# Patient Record
Sex: FEMALE | Race: WHITE | NOT HISPANIC OR LATINO | Employment: UNEMPLOYED | URBAN - METROPOLITAN AREA
[De-identification: names, ages, dates, MRNs, and addresses within clinical notes are randomized per-mention and may not be internally consistent; named-entity substitution may affect disease eponyms.]

---

## 2020-12-03 ENCOUNTER — OFFICE VISIT (OUTPATIENT)
Dept: INTERNAL MEDICINE | Facility: CLINIC | Age: 40
End: 2020-12-03

## 2020-12-03 VITALS
HEART RATE: 101 BPM | DIASTOLIC BLOOD PRESSURE: 82 MMHG | OXYGEN SATURATION: 97 % | HEIGHT: 65 IN | TEMPERATURE: 97.3 F | WEIGHT: 212 LBS | BODY MASS INDEX: 35.32 KG/M2 | SYSTOLIC BLOOD PRESSURE: 128 MMHG

## 2020-12-03 DIAGNOSIS — R00.2 PALPITATION: ICD-10-CM

## 2020-12-03 DIAGNOSIS — M79.7 FIBROMYALGIA: Primary | ICD-10-CM

## 2020-12-03 DIAGNOSIS — R07.89 CHEST WALL DISCOMFORT: ICD-10-CM

## 2020-12-03 DIAGNOSIS — Z30.011 ENCOUNTER FOR INITIAL PRESCRIPTION OF CONTRACEPTIVE PILLS: ICD-10-CM

## 2020-12-03 PROBLEM — J30.2 SEASONAL ALLERGIC RHINITIS: Status: ACTIVE | Noted: 2020-12-03

## 2020-12-03 PROBLEM — R12 HEARTBURN: Status: ACTIVE | Noted: 2020-12-03

## 2020-12-03 PROBLEM — Q05.9: Status: ACTIVE | Noted: 2018-06-21

## 2020-12-03 PROBLEM — Q05.8: Status: ACTIVE | Noted: 2018-09-05

## 2020-12-03 PROCEDURE — 93000 ELECTROCARDIOGRAM COMPLETE: CPT | Performed by: FAMILY MEDICINE

## 2020-12-03 PROCEDURE — 99204 OFFICE O/P NEW MOD 45 MIN: CPT | Performed by: FAMILY MEDICINE

## 2020-12-03 RX ORDER — UREA 10 %
LOTION (ML) TOPICAL
COMMUNITY

## 2020-12-03 RX ORDER — DROSPIRENONE AND ETHINYL ESTRADIOL 0.02-3(28)
KIT ORAL
COMMUNITY
End: 2020-12-03 | Stop reason: SDUPTHER

## 2020-12-03 RX ORDER — CYCLOBENZAPRINE HCL 5 MG
TABLET ORAL
COMMUNITY
End: 2020-12-03 | Stop reason: SDUPTHER

## 2020-12-03 RX ORDER — CETIRIZINE HYDROCHLORIDE 10 MG/1
10 TABLET ORAL DAILY
COMMUNITY

## 2020-12-03 RX ORDER — DROSPIRENONE AND ETHINYL ESTRADIOL 0.02-3(28)
1 KIT ORAL DAILY
Qty: 90 TABLET | Refills: 3 | Status: SHIPPED | OUTPATIENT
Start: 2020-12-03 | End: 2021-11-10

## 2020-12-03 RX ORDER — CYCLOBENZAPRINE HCL 5 MG
5 TABLET ORAL NIGHTLY PRN
Qty: 90 TABLET | Refills: 3 | Status: SHIPPED | OUTPATIENT
Start: 2020-12-03 | End: 2021-11-17

## 2020-12-03 NOTE — PROGRESS NOTES
Venipuncture performed in [facility] by KD with good hemostasis. Patient tolerated well. 12/3/2020 Miladis CARRILLO LPN.
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
## 2020-12-04 ENCOUNTER — TELEPHONE (OUTPATIENT)
Dept: INTERNAL MEDICINE | Facility: CLINIC | Age: 40
End: 2020-12-04

## 2020-12-04 PROBLEM — R74.01 ALT (SGPT) LEVEL RAISED: Status: ACTIVE | Noted: 2020-12-04

## 2020-12-04 LAB
25(OH)D3+25(OH)D2 SERPL-MCNC: 34.5 NG/ML (ref 30–100)
ALBUMIN SERPL-MCNC: 4.8 G/DL (ref 3.8–4.8)
ALBUMIN/GLOB SERPL: 1.6 {RATIO} (ref 1.2–2.2)
ALP SERPL-CCNC: 60 IU/L (ref 39–117)
ALT SERPL-CCNC: 49 IU/L (ref 0–32)
AST SERPL-CCNC: 35 IU/L (ref 0–40)
BASOPHILS # BLD AUTO: 0.1 X10E3/UL (ref 0–0.2)
BASOPHILS NFR BLD AUTO: 1 %
BILIRUB SERPL-MCNC: 0.3 MG/DL (ref 0–1.2)
BUN SERPL-MCNC: 10 MG/DL (ref 6–20)
BUN/CREAT SERPL: 14 (ref 9–23)
CALCIUM SERPL-MCNC: 9.9 MG/DL (ref 8.7–10.2)
CHLORIDE SERPL-SCNC: 101 MMOL/L (ref 96–106)
CO2 SERPL-SCNC: 22 MMOL/L (ref 20–29)
CREAT SERPL-MCNC: 0.73 MG/DL (ref 0.57–1)
EOSINOPHIL # BLD AUTO: 0.2 X10E3/UL (ref 0–0.4)
EOSINOPHIL NFR BLD AUTO: 2 %
ERYTHROCYTE [DISTWIDTH] IN BLOOD BY AUTOMATED COUNT: 12.1 % (ref 11.7–15.4)
FT4I SERPL CALC-MCNC: 1.5 (ref 1.2–4.9)
GLOBULIN SER CALC-MCNC: 3 G/DL (ref 1.5–4.5)
GLUCOSE SERPL-MCNC: 94 MG/DL (ref 65–99)
HCT VFR BLD AUTO: 43.4 % (ref 34–46.6)
HGB BLD-MCNC: 14.6 G/DL (ref 11.1–15.9)
IMM GRANULOCYTES # BLD AUTO: 0 X10E3/UL (ref 0–0.1)
IMM GRANULOCYTES NFR BLD AUTO: 0 %
LYMPHOCYTES # BLD AUTO: 2.2 X10E3/UL (ref 0.7–3.1)
LYMPHOCYTES NFR BLD AUTO: 28 %
MCH RBC QN AUTO: 29.6 PG (ref 26.6–33)
MCHC RBC AUTO-ENTMCNC: 33.6 G/DL (ref 31.5–35.7)
MCV RBC AUTO: 88 FL (ref 79–97)
MONOCYTES # BLD AUTO: 0.3 X10E3/UL (ref 0.1–0.9)
MONOCYTES NFR BLD AUTO: 4 %
NEUTROPHILS # BLD AUTO: 5.1 X10E3/UL (ref 1.4–7)
NEUTROPHILS NFR BLD AUTO: 65 %
PLATELET # BLD AUTO: 334 X10E3/UL (ref 150–450)
POTASSIUM SERPL-SCNC: 4.6 MMOL/L (ref 3.5–5.2)
PROT SERPL-MCNC: 7.8 G/DL (ref 6–8.5)
RBC # BLD AUTO: 4.94 X10E6/UL (ref 3.77–5.28)
SODIUM SERPL-SCNC: 140 MMOL/L (ref 134–144)
T3RU NFR SERPL: 23 % (ref 24–39)
T4 SERPL-MCNC: 6.4 UG/DL (ref 4.5–12)
TSH SERPL DL<=0.005 MIU/L-ACNC: 1.08 UIU/ML (ref 0.45–4.5)
VIT B12 SERPL-MCNC: 1079 PG/ML (ref 232–1245)
WBC # BLD AUTO: 8 X10E3/UL (ref 3.4–10.8)

## 2020-12-04 NOTE — TELEPHONE ENCOUNTER
----- Message from Carter Ellis MD sent at 12/4/2020 12:19 PM EST -----  Please call the patient about their results with the following discussion points:    Kidneys, electrolytes, vitamin D, thyroid, vitamin B12 are all normal  One of her liver tests is very very slightly elevated, we sometimes see this with early fatty liver disease, so this is likely related to her weight.  I see this commonly, and should not cause any problems at this level. We will continue to follow it annually.  Nothing in the blood work points to other causes of her chest discomfort  
LAKESHIAVM for pt re: lab results. Pt was advised of all normal results, as well as indication for early fatty liver disease. Pt was advised to continue watchful waiting, and OTC tx for chest pain, per Dr. Ellis.  
Detail Level: Generalized
Detail Level: Zone

## 2021-04-16 ENCOUNTER — BULK ORDERING (OUTPATIENT)
Dept: CASE MANAGEMENT | Facility: OTHER | Age: 41
End: 2021-04-16

## 2021-04-16 DIAGNOSIS — Z23 IMMUNIZATION DUE: ICD-10-CM

## 2021-05-05 ENCOUNTER — TELEPHONE (OUTPATIENT)
Dept: INTERNAL MEDICINE | Facility: CLINIC | Age: 41
End: 2021-05-05

## 2021-05-05 NOTE — TELEPHONE ENCOUNTER
[facility] for pt re: COVID testing. Pt was advised that we are closed today, as Dr. Ellis is not in office on Wed. Pt was advised to go to either [facility] or contact pharmacies for testing locations.    Pt was also advised that should she wish to wait until we open tomorrow, she will have to be scheduled on the Nurse schedule, stay in her car and call office for me to come out and swab her.     Pt was encouraged to call office with any questions, or to schedule appt.

## 2021-05-06 ENCOUNTER — CLINICAL SUPPORT (OUTPATIENT)
Dept: INTERNAL MEDICINE | Facility: CLINIC | Age: 41
End: 2021-05-06

## 2021-05-06 DIAGNOSIS — R50.9 FEVER, UNSPECIFIED FEVER CAUSE: Primary | ICD-10-CM

## 2021-05-06 DIAGNOSIS — R52 BODY ACHES: ICD-10-CM

## 2021-05-07 LAB
LABCORP SARS-COV-2, NAA 2 DAY TAT: NORMAL
SARS-COV-2 RNA RESP QL NAA+PROBE: NOT DETECTED

## 2021-05-27 DIAGNOSIS — Z30.011 ENCOUNTER FOR INITIAL PRESCRIPTION OF CONTRACEPTIVE PILLS: ICD-10-CM

## 2021-05-27 RX ORDER — DROSPIRENONE AND ETHINYL ESTRADIOL 0.02-3(28)
1 KIT ORAL DAILY
Qty: 90 TABLET | Refills: 3 | Status: CANCELLED | OUTPATIENT
Start: 2021-05-27

## 2021-05-28 NOTE — TELEPHONE ENCOUNTER
Spoke to patient and advised she should have refills on file with pharmacy and to contact them.  If patient has any issues, she is to call us back.  Pt was in agreement.

## 2021-05-28 NOTE — TELEPHONE ENCOUNTER
Caller: Giana Barraza    Relationship: Self    Best call back number: [phone]    Medication needed:   Requested Prescriptions     Pending Prescriptions Disp Refills   • drospirenone-ethinyl estradiol (Gianvi) 3-0.02 MG per tablet 90 tablet 3     Sig: Take 1 tablet by mouth Daily.       When do you need the refill by: ASAP    What additional details did the patient provide when requesting the medication: PATIENT STATED NEEDS MEDICATION WILL START Sunday AND PATIENT ONLY HAD TWO REFILLS ON MEDICATION.     Does the patient have less than a 3 day supply:  [x] Yes  [] No    What is the patient's preferred pharmacy:    [facility]/pharmacy #[id number] - [place], [place] - [address] [phone] [facility] [phone] FX

## 2021-06-03 ENCOUNTER — OFFICE VISIT (OUTPATIENT)
Dept: INTERNAL MEDICINE | Facility: CLINIC | Age: 41
End: 2021-06-03

## 2021-06-03 VITALS
SYSTOLIC BLOOD PRESSURE: 124 MMHG | HEART RATE: 101 BPM | WEIGHT: 221 LBS | BODY MASS INDEX: 36.82 KG/M2 | DIASTOLIC BLOOD PRESSURE: 78 MMHG | OXYGEN SATURATION: 98 % | HEIGHT: 65 IN

## 2021-06-03 DIAGNOSIS — M79.7 FIBROMYALGIA: Primary | ICD-10-CM

## 2021-06-03 DIAGNOSIS — R12 HEARTBURN: ICD-10-CM

## 2021-06-03 DIAGNOSIS — R74.01 ALT (SGPT) LEVEL RAISED: ICD-10-CM

## 2021-06-03 DIAGNOSIS — R07.89 CHEST WALL DISCOMFORT: ICD-10-CM

## 2021-06-03 DIAGNOSIS — L30.9 ECZEMA, UNSPECIFIED TYPE: ICD-10-CM

## 2021-06-03 PROBLEM — R00.2 PALPITATION: Status: RESOLVED | Noted: 2020-12-03 | Resolved: 2021-06-03

## 2021-06-03 PROCEDURE — 99214 OFFICE O/P EST MOD 30 MIN: CPT | Performed by: FAMILY MEDICINE

## 2021-06-03 RX ORDER — OMEPRAZOLE 20 MG/1
20 CAPSULE, DELAYED RELEASE ORAL DAILY PRN
COMMUNITY

## 2021-06-03 RX ORDER — COVID-19 ANTIGEN TEST
KIT MISCELLANEOUS AS NEEDED
COMMUNITY

## 2021-06-03 RX ORDER — CLOBETASOL PROPIONATE 0.5 MG/G
OINTMENT TOPICAL 2 TIMES DAILY PRN
Qty: 15 G | Refills: 3 | Status: SHIPPED | OUTPATIENT
Start: 2021-06-03

## 2021-06-03 RX ORDER — MULTIPLE VITAMINS W/ MINERALS TAB 9MG-400MCG
1 TAB ORAL DAILY
COMMUNITY

## 2021-06-03 NOTE — PROGRESS NOTES
Date of Encounter: 2021  Patient: Giana Barraza,  1980    Subjective   History of Presenting Illness  Chief complaint: Follow-up    Fibromyalgia: Overall her symptoms are better with the warmer weather although she still has flares with weather changes.  Chest wall discomfort and palpitations have improved since last appointment and only encouraged sparingly.  No change in the nature of these.  Still has fairly significant premenstrual dysphoric syndrome versus fibromyalgia flare before her cycle.    Eczema, primarily of the hands with regular dishwashing and hand  use being the main aggravator, control with topical clobetasol, needs refill.    Heartburn remains well controlled on omeprazole as needed.    Has not received COVID-19 vaccination, hesitant due to concerns about safety    The following portions of the patient's history were reviewed and updated as appropriate: allergies, current medications, past family history, past medical history, past social history, past surgical history and problem list.    Patient Active Problem List   Diagnosis   • Fibromyalgia   • Lipomyelomeningocele (CMS/HCC)   • Congenital sacral meningocele (CMS/HCC)   • Palpitation   • Chest wall discomfort   • Heartburn   • Seasonal allergic rhinitis   • ALT (SGPT) level raised     Past Medical History:   Diagnosis Date   • Fibromyalgia, primary      Past Surgical History:   Procedure Laterality Date   • DILATATION AND CURETTAGE       Family History   Problem Relation Age of Onset   • Colon cancer Mother    • No Known Problems Father    • Heart disease Paternal Grandfather        Current Outpatient Medications:   •  cetirizine (zyrTEC) 10 MG tablet, Take 10 mg by mouth Daily., Disp: , Rfl:   •  cyclobenzaprine (FLEXERIL) 5 MG tablet, Take 1 tablet by mouth At Night As Needed for Muscle Spasms., Disp: 90 tablet, Rfl: 3  •  drospirenone-ethinyl estradiol (Gianvi) 3-0.02 MG per tablet, Take 1 tablet by mouth Daily.,  "Disp: 90 tablet, Rfl: 3  •  melatonin 1 MG tablet, Take  by mouth., Disp: , Rfl:   •  multivitamin with minerals (MULTIPLE VITAMINS/WOMENS PO), Take 1 tablet by mouth Daily., Disp: , Rfl:   •  Naproxen Sodium (Aleve) 220 MG capsule, Take  by mouth As Needed., Disp: , Rfl:   •  omeprazole (priLOSEC) 20 MG capsule, Take 20 mg by mouth Daily As Needed., Disp: , Rfl:   •  PROBIOTIC PRODUCT PO, qhs, Disp: , Rfl:   •  VITAMIN D PO, Vitamin D  once daily, Disp: , Rfl:   •  clobetasol (TEMOVATE) 0.05 % ointment, Apply  topically to the appropriate area as directed 2 (Two) Times a Day As Needed (eczema)., Disp: 15 g, Rfl: 3  •  PRENATAL VIT-FE FUMARATE-FA PO, Prenatal  1 po qd, Disp: , Rfl:   No Known Allergies  Social History     Tobacco Use   • Smoking status: Never Smoker   • Smokeless tobacco: Never Used   Substance Use Topics   • Alcohol use: Not on file   • Drug use: Not on file          Objective   Physical Exam  Vitals:    06/03/21 0941   BP: 124/78   Pulse: 101   SpO2: 98%   Weight: 100 kg (221 lb)   Height: 165.1 cm (65\")     Body mass index is 36.78 kg/m².    Constitutional: NAD.  Eyes: EOMI. PERRLA. Normal conjunctiva.  Cardiovascular: RRR. No murmurs. No LE edema b/l. Radial pulses 2+ bilaterally.  Pulmonary: CTA b/l. Good effort.  Integumentary: Eczematous plaque on the lateral aspect of the right index finger.  Lymphatic: No anterior cervical lymphadenopathy.  Endocrine: No thyromegaly or palpable thyroid nodules.  Psychiatric: Normal affect. Normal thought content.     Assessment/Plan   Assessment and Plan  Pleasant 40-year-old female with fibromyalgia, sacral lipomyelomeningocele asymptomatic, seasonal allergic rhinitis, heartburn, who presents with the following:    Diagnoses and all orders for this visit:    1. Fibromyalgia (Primary): Overall stable to slightly improved, continue cyclobenzaprine as needed for symptoms    2. Chest wall discomfort: Resolving, continue to monitor for recurrence or change in " nature of symptoms    3. Eczema, unspecified type  -     clobetasol (TEMOVATE) 0.05 % ointment; Apply  topically to the appropriate area as directed 2 (Two) Times a Day As Needed (eczema).  Dispense: 15 g; Refill: 3    4. Heartburn: Controlled    5. ALT (SGPT) level raised: Possibly early NAFLD, labs to recheck  -     Comprehensive Metabolic Panel    We did discuss the risks and benefits of COVID-19 vaccination    Annual physical later this year with Pap smear    Carter Ellis MD  Family Medicine  O: [phone]  C: [phone]    Disclaimer: Parts of this note were dictated by speech recognition. Minor errors in transcription may be present. Please call if questions.

## 2021-06-03 NOTE — PROGRESS NOTES
Venipuncture performed in L[facility] by KD with good hemostasis. Patient tolerated well. 6/3/2021 Miladis CARRILLO LPN.

## 2021-06-04 LAB
ALBUMIN SERPL-MCNC: 4.5 G/DL (ref 3.8–4.8)
ALBUMIN/GLOB SERPL: 1.4 {RATIO} (ref 1.2–2.2)
ALP SERPL-CCNC: 60 IU/L (ref 48–121)
ALT SERPL-CCNC: 25 IU/L (ref 0–32)
AST SERPL-CCNC: 26 IU/L (ref 0–40)
BILIRUB SERPL-MCNC: 0.4 MG/DL (ref 0–1.2)
BUN SERPL-MCNC: 8 MG/DL (ref 6–20)
BUN/CREAT SERPL: 10 (ref 9–23)
CALCIUM SERPL-MCNC: 9.9 MG/DL (ref 8.7–10.2)
CHLORIDE SERPL-SCNC: 101 MMOL/L (ref 96–106)
CO2 SERPL-SCNC: 26 MMOL/L (ref 20–29)
CREAT SERPL-MCNC: 0.8 MG/DL (ref 0.57–1)
GLOBULIN SER CALC-MCNC: 3.2 G/DL (ref 1.5–4.5)
GLUCOSE SERPL-MCNC: 91 MG/DL (ref 65–99)
POTASSIUM SERPL-SCNC: 4.7 MMOL/L (ref 3.5–5.2)
PROT SERPL-MCNC: 7.7 G/DL (ref 6–8.5)
SODIUM SERPL-SCNC: 141 MMOL/L (ref 134–144)

## 2021-11-10 DIAGNOSIS — Z30.011 ENCOUNTER FOR INITIAL PRESCRIPTION OF CONTRACEPTIVE PILLS: ICD-10-CM

## 2021-11-10 RX ORDER — ETHINYL ESTRADIOL/DROSPIRENONE 0.02-3(28)
TABLET ORAL
Qty: 84 TABLET | Refills: 3 | Status: SHIPPED | OUTPATIENT
Start: 2021-11-10 | End: 2022-10-07

## 2021-11-17 DIAGNOSIS — M79.7 FIBROMYALGIA: ICD-10-CM

## 2021-11-17 RX ORDER — CYCLOBENZAPRINE HCL 5 MG
5 TABLET ORAL NIGHTLY PRN
Qty: 90 TABLET | Refills: 3 | Status: SHIPPED | OUTPATIENT
Start: 2021-11-17 | End: 2022-11-07

## 2021-11-24 ENCOUNTER — OFFICE VISIT (OUTPATIENT)
Dept: INTERNAL MEDICINE | Facility: CLINIC | Age: 41
End: 2021-11-24

## 2021-11-24 VITALS
SYSTOLIC BLOOD PRESSURE: 126 MMHG | DIASTOLIC BLOOD PRESSURE: 80 MMHG | BODY MASS INDEX: 36.78 KG/M2 | HEIGHT: 65 IN | OXYGEN SATURATION: 100 % | TEMPERATURE: 98.6 F | HEART RATE: 96 BPM

## 2021-11-24 DIAGNOSIS — R05.9 COUGH: Primary | ICD-10-CM

## 2021-11-24 DIAGNOSIS — J30.1 SEASONAL ALLERGIC RHINITIS DUE TO POLLEN: ICD-10-CM

## 2021-11-24 DIAGNOSIS — R74.01 ALT (SGPT) LEVEL RAISED: ICD-10-CM

## 2021-11-24 DIAGNOSIS — R09.89 CHEST CONGESTION: ICD-10-CM

## 2021-11-24 DIAGNOSIS — M79.7 FIBROMYALGIA: ICD-10-CM

## 2021-11-24 LAB
EXPIRATION DATE: NORMAL
FLUAV AG NPH QL: NEGATIVE
FLUBV AG NPH QL: NEGATIVE
INTERNAL CONTROL: NORMAL
Lab: 2653

## 2021-11-24 PROCEDURE — 87804 INFLUENZA ASSAY W/OPTIC: CPT | Performed by: NURSE PRACTITIONER

## 2021-11-24 PROCEDURE — 99214 OFFICE O/P EST MOD 30 MIN: CPT | Performed by: NURSE PRACTITIONER

## 2021-11-24 RX ORDER — BENZONATATE 100 MG/1
100 CAPSULE ORAL 3 TIMES DAILY PRN
Qty: 30 CAPSULE | Refills: 0 | Status: SHIPPED | OUTPATIENT
Start: 2021-11-24 | End: 2023-02-28

## 2021-11-24 RX ORDER — AZITHROMYCIN 250 MG/1
TABLET, FILM COATED ORAL
Qty: 6 TABLET | Refills: 0 | Status: SHIPPED | OUTPATIENT
Start: 2021-11-24 | End: 2023-02-28

## 2021-11-24 RX ORDER — GUAIFENESIN 600 MG/1
1200 TABLET, EXTENDED RELEASE ORAL 2 TIMES DAILY
Qty: 20 TABLET | Refills: 0 | Status: SHIPPED | OUTPATIENT
Start: 2021-11-24 | End: 2023-02-28

## 2021-11-24 NOTE — PROGRESS NOTES
Date of Encounter: 2021  Patient: Giana Barraza,  1980    Subjective     Chief complaint: Cough and congestion    HPI   Patient states that over the last week she has started to feel sick with cough and congestion.  Patient states that she cannot sleep at night due to cough.  Patient states that she had a headache and some body aches the first day but now is mostly concerned about her cough.  Patient states that she has become fatigued during the day as well.  Patient denies any fever.  Patient states that her son has been sick as well.    Review of Systems:  Negative for fever, chest pain upon exertion, shortness of breath, vision changes, vomiting, dysuria, lymphadenopathy, muscle weakness, numbness, mood changes, rashes.    The following portions of the patient's history were reviewed and updated as appropriate: allergies, current medications, past family history, past medical history, past social history, past surgical history and problem list.    Patient Active Problem List   Diagnosis   • Fibromyalgia   • Lipomyelomeningocele (HCC)   • Congenital sacral meningocele (HCC)   • Heartburn   • Seasonal allergic rhinitis   • ALT (SGPT) level raised     Past Medical History:   Diagnosis Date   • Chest wall discomfort 12/3/2020   • Fibromyalgia, primary    • Palpitation 12/3/2020     Past Surgical History:   Procedure Laterality Date   • DILATATION AND CURETTAGE       Family History   Problem Relation Age of Onset   • Colon cancer Mother    • No Known Problems Father    • Heart disease Paternal Grandfather        Current Outpatient Medications:   •  cetirizine (zyrTEC) 10 MG tablet, Take 10 mg by mouth Daily., Disp: , Rfl:   •  clobetasol (TEMOVATE) 0.05 % ointment, Apply  topically to the appropriate area as directed 2 (Two) Times a Day As Needed (eczema)., Disp: 15 g, Rfl: 3  •  cyclobenzaprine (FLEXERIL) 5 MG tablet, TAKE 1 TABLET BY MOUTH AT NIGHT AS NEEDED FOR MUSCLE SPASMS., Disp: 90 tablet, Rfl:  "3  •  melatonin 1 MG tablet, Take  by mouth., Disp: , Rfl:   •  multivitamin with minerals (MULTIPLE VITAMINS/WOMENS PO), Take 1 tablet by mouth Daily., Disp: , Rfl:   •  Naproxen Sodium (Aleve) 220 MG capsule, Take  by mouth As Needed., Disp: , Rfl:   •  Kelsey 3-0.02 MG per tablet, TAKE 1 TABLET BY MOUTH EVERY DAY, Disp: 84 tablet, Rfl: 3  •  omeprazole (priLOSEC) 20 MG capsule, Take 20 mg by mouth Daily As Needed., Disp: , Rfl:   •  PROBIOTIC PRODUCT PO, qhs, Disp: , Rfl:   •  VITAMIN D PO, Vitamin D  once daily, Disp: , Rfl:   •  azithromycin (Zithromax) 250 MG tablet, Take as directed on package, Disp: 6 tablet, Rfl: 0  •  benzonatate (Tessalon Perles) 100 MG capsule, Take 1 capsule by mouth 3 (Three) Times a Day As Needed for Cough., Disp: 30 capsule, Rfl: 0  •  guaiFENesin (Mucinex) 600 MG 12 hr tablet, Take 2 tablets by mouth 2 (Two) Times a Day., Disp: 20 tablet, Rfl: 0  •  PRENATAL VIT-FE FUMARATE-FA PO, Prenatal  1 po qd, Disp: , Rfl:   No Known Allergies  Social History     Tobacco Use   • Smoking status: Never Smoker   • Smokeless tobacco: Never Used   Substance Use Topics   • Alcohol use: Not on file   • Drug use: Not on file          Objective   Physical Exam   Vitals:    11/24/21 0934   BP: 126/80   Pulse: 96   Temp: 98.6 °F (37 °C)   TempSrc: Temporal   SpO2: 100%   Height: 165.1 cm (65\")     Body mass index is 36.78 kg/m².    Constitutional: NAD.  Eyes: EOMI. PERRLA. Normal conjunctiva.  Ear, nose, mouth, throat: No tonsillar exudates mild erythema.   Normal nasal mucosa.  Right TM dull..  Cardiovascular: RRR. No murmurs. Pulmonary: CTA b/l. Good effort.  Integumentary: No rashes or wounds on face or upper extremities.  Lymphatic: No anterior cervical lymphadenopathy.  Psychiatric: Normal affect. Normal thought content.           Assessment/Plan   Assessment and Plan  Pleasant 41-year-old female with history of seasonal allergic rhinitis, raised ALT, fibromyalgia and others here today for cough and " congestion.    Diagnoses and all orders for this visit:    1. Cough (Primary)  -     COVID-19,[facility] ROUTINE, NP/OP SWAB IN TRANSPORT MEDIA OR ESWAB 72 HR TAT - Swab, Nasopharynx  -     benzonatate (Tessalon Perles) 100 MG capsule; Take 1 capsule by mouth 3 (Three) Times a Day As Needed for Cough.  Dispense: 30 capsule; Refill: 0  -     azithromycin (Zithromax) 250 MG tablet; Take as directed on package  Dispense: 6 tablet; Refill: 0   -Discussed with patient about waiting to start antibiotic.  Patient to start taking after 14 days of symptoms.  Side effects discussed.  Recommended probiotic.  2. Chest congestion  -     POCT Influenza A/B  -     guaiFENesin (Mucinex) 600 MG 12 hr tablet; Take 2 tablets by mouth 2 (Two) Times a Day.  Dispense: 20 tablet; Refill: 0    3. Seasonal allergic rhinitis due to pollen   Patient to continue with daily Zyrtec.    4. Fibromyalgia   Stable.  Patient uses Tylenol as needed    5. ALT (SGPT) level raised   -Previous labs reviewed.  Discussed about patient coming back in to update labs in December.         Had a long discussion with patient about symptoms.  Answered some questions from the patient.  Encourage patient to rest and stay well-hydrated.  Discussed about quarantine related to Covid.  Verbalized understanding of and agreed to plan of care.  Return if symptoms worsen or fail to improve, for Annual physical.     Greta Dowell DNP, FNP-C  [facility]  O: [phone]      Please note that portions of this note were completed with a voice recognition program. Please call if questions.

## 2021-11-25 LAB
LABCORP SARS-COV-2, NAA 2 DAY TAT: NORMAL
SARS-COV-2 RNA RESP QL NAA+PROBE: NOT DETECTED

## 2021-11-29 ENCOUNTER — TELEPHONE (OUTPATIENT)
Dept: INTERNAL MEDICINE | Facility: CLINIC | Age: 41
End: 2021-11-29

## 2021-11-29 NOTE — TELEPHONE ENCOUNTER
----- Message from JONI Hinson sent at 11/29/2021  8:04 AM EST -----  Please let patient know Covid test was negative.

## 2022-01-25 ENCOUNTER — TELEPHONE (OUTPATIENT)
Dept: INTERNAL MEDICINE | Facility: CLINIC | Age: 42
End: 2022-01-25

## 2022-01-25 NOTE — TELEPHONE ENCOUNTER
Spoke to patient, she went to the hospital on 1/23. DX with COVID pneumonia but was not given any treatment, was told to F/U with pcp. Please advise,     Thanks!

## 2022-01-25 NOTE — TELEPHONE ENCOUNTER
Caller: Giana Barraza    Relationship to patient: Self    Best call back number: [phone]    Date of positive COVID19 test: 1/24/22 AT [facility]    COVID19 symptoms: HEADACHE, EARS CLOGGED, FATIGUE, BODY ACHES, FEVER, CONGESTION, COUGH, LOSS OF TASTE AND SMELL    Additional information or concerns: PATIENT HAS BEEN TAKING TYLENOL FOR THE FEVER. PATIENT STATES THEY TOLD HER THAT SHE HAS COVID PNEUMONIA AT THE ER. PATIENT WANTS TO KNOW IF DR. ROSARIO WILL CALL IN MEDICATION TO HELP WITH HER SYMPTOMS.    PLEASE ADVISE    What is the patients preferred pharmacy: [facility]/pharmacy #[id number] - [place], [place] - [address] [phone] [address] [phone] [phone]

## 2022-01-25 NOTE — TELEPHONE ENCOUNTER
"Patient aware of below statement,    Currently, there are no medications we recommend to treat Covid outside of the hospital.  There are monoclonal antibody infusions we use to reduce risk of being hospitalized but unfortunately due to a limited supply and a massive Covid surge I am unable to get my patients in for these infusions right now.       I do think that regular use of Tylenol and ibuprofen can be safe and helpful to control some of your symptoms.       Some other things I recommended for my other patients:     Stay hydrated  Make sure you walk regularly to prevent formation of blood clots  Take vitamin D daily  Sleep on your stomach if you can  Take frequent deep breaths - google \"mcclain coughing\" to clear mucus as well  If you have access to a pulse oximeter, I recommend going to the hospital if your oxygen drops below 90% for a prolonged period of time.  If your shortness of breath is worsening, or you are having chest pain, leg swelling, or other symptoms that are worsening, please get evaluated in urgent care or emergency room        No questions at this time.   "

## 2022-10-07 DIAGNOSIS — Z30.011 ENCOUNTER FOR INITIAL PRESCRIPTION OF CONTRACEPTIVE PILLS: ICD-10-CM

## 2022-10-07 RX ORDER — ETHINYL ESTRADIOL/DROSPIRENONE 0.02-3(28)
TABLET ORAL
Qty: 84 TABLET | Refills: 3 | Status: SHIPPED | OUTPATIENT
Start: 2022-10-07

## 2022-10-17 ENCOUNTER — FLU SHOT (OUTPATIENT)
Dept: INTERNAL MEDICINE | Facility: CLINIC | Age: 42
End: 2022-10-17

## 2022-10-17 DIAGNOSIS — Z23 NEED FOR INFLUENZA VACCINATION: Primary | ICD-10-CM

## 2022-10-17 PROCEDURE — 90686 IIV4 VACC NO PRSV 0.5 ML IM: CPT | Performed by: FAMILY MEDICINE

## 2022-10-17 PROCEDURE — 90471 IMMUNIZATION ADMIN: CPT | Performed by: FAMILY MEDICINE

## 2022-11-06 DIAGNOSIS — M79.7 FIBROMYALGIA: ICD-10-CM

## 2022-11-07 RX ORDER — CYCLOBENZAPRINE HCL 5 MG
5 TABLET ORAL NIGHTLY PRN
Qty: 90 TABLET | Refills: 3 | Status: SHIPPED | OUTPATIENT
Start: 2022-11-07

## 2023-02-28 ENCOUNTER — OFFICE VISIT (OUTPATIENT)
Dept: INTERNAL MEDICINE | Facility: CLINIC | Age: 43
End: 2023-02-28
Payer: COMMERCIAL

## 2023-02-28 VITALS
HEIGHT: 65 IN | TEMPERATURE: 97.6 F | BODY MASS INDEX: 37.65 KG/M2 | OXYGEN SATURATION: 98 % | WEIGHT: 226 LBS | SYSTOLIC BLOOD PRESSURE: 128 MMHG | HEART RATE: 96 BPM | DIASTOLIC BLOOD PRESSURE: 88 MMHG

## 2023-02-28 DIAGNOSIS — J30.1 SEASONAL ALLERGIC RHINITIS DUE TO POLLEN: Primary | ICD-10-CM

## 2023-02-28 PROCEDURE — 99213 OFFICE O/P EST LOW 20 MIN: CPT | Performed by: NURSE PRACTITIONER

## 2023-02-28 NOTE — PROGRESS NOTES
"Chief Complaint  Sinus Problem (pressure), Cough, Earache, and Ear Drainage (To throat)    Subjective        Giana Barraza presents to [facility]  History of Present Illness  Complains ofsinus pain and pressure with cough, ear pain, mainly in her right ear, with drainage in the back of her throat and runny nose for the past 5-7 days. Her family has been ill,  and daughter were diagnosed with strep pharyngitis.     She has been taking OTC Sudafed, usually once daily, for the past couple days. She takes Zyrtec nightly and started Flonase about 2 days.   Getting up and moving around worsens the symptoms. Does not bother her when she is lying down. No ear drainage.       Objective   Vital Signs:  /88 (BP Location: Left arm, Patient Position: Sitting)   Pulse 96   Temp 97.6 °F (36.4 °C) (Infrared)   Ht 165.1 cm (65\")   Wt 103 kg (226 lb)   SpO2 98%   BMI 37.61 kg/m²   Estimated body mass index is 37.61 kg/m² as calculated from the following:    Height as of this encounter: 165.1 cm (65\").    Weight as of this encounter: 103 kg (226 lb).             Physical Exam  Constitutional:       Appearance: Normal appearance. She is not ill-appearing.   HENT:      Right Ear: Hearing normal. A middle ear effusion is present.      Left Ear: Hearing normal. A middle ear effusion is present.      Ears:      Comments: Bilateral ear effusion is mildly cloudy.      Nose: Rhinorrhea present.      Mouth/Throat:      Mouth: Mucous membranes are moist.   Eyes:      Pupils: Pupils are equal, round, and reactive to light.   Cardiovascular:      Rate and Rhythm: Normal rate and regular rhythm.      Pulses: Normal pulses.      Heart sounds: Normal heart sounds.   Pulmonary:      Effort: Pulmonary effort is normal.      Breath sounds: Normal breath sounds.   Musculoskeletal:      Cervical back: Normal range of motion.   Lymphadenopathy:      Head:      Right side of head: No submental, " submandibular, tonsillar, preauricular, posterior auricular or occipital adenopathy.      Left side of head: No submental, submandibular, tonsillar, preauricular, posterior auricular or occipital adenopathy.      Cervical: No cervical adenopathy.      Right cervical: No superficial, deep or posterior cervical adenopathy.     Left cervical: No superficial, deep or posterior cervical adenopathy.   Neurological:      Mental Status: She is alert.        Result Review :                   Assessment and Plan   Patient is a pleasant 42-year-old female who presents with 5 to 7-day history of runny nose, bilateral ear pain, and cough.  She only recently restarted seasonal allergy medications 2 days ago, which include over-the-counter Flonase and cetirizine.  Suspect that this is either seasonal allergy related versus viral etiology    Continue current allergy medication regimen and consider antibiotic therapy if not improved in 1 week.    Diagnoses and all orders for this visit:    1. Seasonal allergic rhinitis due to pollen (Primary)  Comments:  Continue Flonase with allergy medication. May use OTC saline, and nasal lavage with distilled water. Can also take acetaminophen for pain.              Follow Up   Return if symptoms worsen or fail to improve.  Patient was given instructions and counseling regarding her condition or for health maintenance advice. Please see specific information pulled into the AVS if appropriate.         Answers for HPI/ROS submitted by the patient on 2/28/2023  Please describe your symptoms.: Congestion-especially prominent in ears, Slight discomfort/severe pressure  Have you had these symptoms before?: No  How long have you been having these symptoms?: 5-7 days  Please list any medications you are currently taking for this condition.: Sudafed  Please describe any probable cause for these symptoms. : My children have had Strep throat in the last 2 wks.  What is the primary reason for your visit?:  Other

## 2023-03-03 ENCOUNTER — PATIENT MESSAGE (OUTPATIENT)
Dept: INTERNAL MEDICINE | Facility: CLINIC | Age: 43
End: 2023-03-03
Payer: COMMERCIAL

## 2023-03-03 DIAGNOSIS — J01.40 SUBACUTE PANSINUSITIS: Primary | ICD-10-CM

## 2023-03-03 NOTE — TELEPHONE ENCOUNTER
From: Giana Barraza  To: Viktoria Donohue  Sent: 3/3/2023 6:26 AM EST  Subject: Ear situation     Good morning Dr Donohue -  I am following up with you on our conversation Tuesday. I’m still experiencing the pressure and full feeling in my ear, although it has improved a bit, still not completely cleared up. I would have to say the most annoying is this frequent ‘thumping’ in the my right ear.   I’ve continued on w: Flonase btw. Please advise. Thank you so much!

## 2023-03-06 RX ORDER — AMOXICILLIN AND CLAVULANATE POTASSIUM 875; 125 MG/1; MG/1
1 TABLET, FILM COATED ORAL 2 TIMES DAILY
Qty: 14 TABLET | Refills: 0 | Status: SHIPPED | OUTPATIENT
Start: 2023-03-06 | End: 2023-03-13

## 2023-08-07 ENCOUNTER — OFFICE VISIT (OUTPATIENT)
Dept: INTERNAL MEDICINE | Facility: CLINIC | Age: 43
End: 2023-08-07
Payer: COMMERCIAL

## 2023-08-07 VITALS
DIASTOLIC BLOOD PRESSURE: 78 MMHG | TEMPERATURE: 96.9 F | BODY MASS INDEX: 36.63 KG/M2 | OXYGEN SATURATION: 99 % | SYSTOLIC BLOOD PRESSURE: 122 MMHG | WEIGHT: 220.1 LBS | HEART RATE: 97 BPM

## 2023-08-07 DIAGNOSIS — Z00.00 ANNUAL PHYSICAL EXAM: Primary | ICD-10-CM

## 2023-08-07 DIAGNOSIS — R74.01 ALT (SGPT) LEVEL RAISED: ICD-10-CM

## 2023-08-07 DIAGNOSIS — E66.09 CLASS 2 OBESITY DUE TO EXCESS CALORIES WITHOUT SERIOUS COMORBIDITY WITH BODY MASS INDEX (BMI) OF 35.0 TO 35.9 IN ADULT: ICD-10-CM

## 2023-08-07 DIAGNOSIS — R12 HEARTBURN: ICD-10-CM

## 2023-08-07 DIAGNOSIS — J30.1 SEASONAL ALLERGIC RHINITIS DUE TO POLLEN: ICD-10-CM

## 2023-08-07 DIAGNOSIS — Q05.8: ICD-10-CM

## 2023-08-07 DIAGNOSIS — Z13.9 SCREENING FOR UNSPECIFIED CONDITION: ICD-10-CM

## 2023-08-07 DIAGNOSIS — G43.829 MENSTRUAL MIGRAINE WITHOUT STATUS MIGRAINOSUS, NOT INTRACTABLE: ICD-10-CM

## 2023-08-07 DIAGNOSIS — Z12.31 ENCOUNTER FOR SCREENING MAMMOGRAM FOR MALIGNANT NEOPLASM OF BREAST: ICD-10-CM

## 2023-08-07 DIAGNOSIS — M79.7 FIBROMYALGIA: ICD-10-CM

## 2023-08-07 PROBLEM — E66.812 CLASS 2 OBESITY DUE TO EXCESS CALORIES WITHOUT SERIOUS COMORBIDITY WITH BODY MASS INDEX (BMI) OF 35.0 TO 35.9 IN ADULT: Status: ACTIVE | Noted: 2023-08-07

## 2023-08-07 PROCEDURE — 99396 PREV VISIT EST AGE 40-64: CPT | Performed by: FAMILY MEDICINE

## 2023-08-07 NOTE — PROGRESS NOTES
Date of Encounter: 2023  Patient: Giana Barraza,  1980    Subjective   History of Presenting Illness  Chief complaint: Annual physical    No acute complaints.    Palpitations, has had these for many years, with previous cardiovascular evaluation around the time that she was initially diagnosed with fibromyalgia.  She describes these as occurring about once per year, most recent episode was associated with gastrointestinal symptoms, sleep disturbance, and she ended up having palpitations when she woke up.  She did not have any deidre dyspnea with this or chest pain.  She has not had any further episodes.  The other associated symptoms have resolved as well.    GERD, generally well-controlled on PPI as needed.  She occasionally has epigastric discomfort that radiates to both of her shoulders with but that is not sustained and appears to be episodic.    Headaches and occasionally migraines associated with menstruation, managed well with NSAIDs as needed.    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  No bowel or bladder problems.    Seasonal allergic rhinitis controlled with over-the-counter medication    Lives with . 2 children.  Never smoker.  Does not drink alcohol.  Walks 5-6 times per week.  Fairly healthy diet, tries to minimize excess sugars due inflammation and fibromyalgia.  Stay-at-home mom.  Last Pap smear 2018, these have always been normal, not currently seeing a gynecologist.  On combined oral contraceptives, no history of blood clots, no family history of blood clots.  Discussed upcoming COVID-19 booster.    Review of Systems:  Negative for fever, congestion, chest pain upon exertion, shortness of breath, vision changes, vomiting, dysuria, lymphadenopathy, muscle weakness, numbness, mood changes, rashes.    The following portions of the patient's history were reviewed and updated as  appropriate: allergies, current medications, past family history, past medical history, past social history, past surgical history and problem list.    Patient Active Problem List   Diagnosis    Fibromyalgia    Lipomyelomeningocele    Congenital sacral meningocele    Palpitations    Heartburn    Seasonal allergic rhinitis    ALT (SGPT) level raised    Menstrual migraine without status migrainosus, not intractable    Class 2 obesity due to excess calories without serious comorbidity with body mass index (BMI) of 35.0 to 35.9 in adult     Past Medical History:   Diagnosis Date    Chest wall discomfort 12/3/2020    Fibromyalgia, primary     Palpitation 12/3/2020     Past Surgical History:   Procedure Laterality Date    DILATATION AND CURETTAGE       Family History   Problem Relation Age of Onset    Colon cancer Mother     No Known Problems Father     Heart disease Paternal Grandfather        Current Outpatient Medications:     cetirizine (zyrTEC) 10 MG tablet, Take 1 tablet by mouth Daily., Disp: , Rfl:     clobetasol (TEMOVATE) 0.05 % ointment, Apply  topically to the appropriate area as directed 2 (Two) Times a Day As Needed (eczema)., Disp: 15 g, Rfl: 3    cyclobenzaprine (FLEXERIL) 5 MG tablet, TAKE 1 TABLET BY MOUTH AT NIGHT AS NEEDED FOR MUSCLE SPASMS., Disp: 90 tablet, Rfl: 3    melatonin 1 MG tablet, Take  by mouth., Disp: , Rfl:     multivitamin with minerals tablet tablet, Take 1 tablet by mouth Daily., Disp: , Rfl:     Naproxen Sodium 220 MG capsule, Take  by mouth As Needed., Disp: , Rfl:     Kelsey 3-0.02 MG per tablet, TAKE 1 TABLET BY MOUTH EVERY DAY, Disp: 84 tablet, Rfl: 3    omeprazole (priLOSEC) 20 MG capsule, Take 1 capsule by mouth Daily As Needed., Disp: , Rfl:     PROBIOTIC PRODUCT PO, qhs, Disp: , Rfl:     VITAMIN D PO, Vitamin D  once daily, Disp: , Rfl:   No Known Allergies  Social History     Tobacco Use    Smoking status: Never    Smokeless tobacco: Never   Vaping Use    Vaping Use: Never  used   Substance Use Topics    Alcohol use: Not Currently    Drug use: Never          Objective   Physical Exam  Vitals:    08/07/23 1111   BP: 122/78   BP Location: Left arm   Patient Position: Sitting   Cuff Size: Large Adult   Pulse: 97   Temp: 96.9 øF (36.1 øC)   TempSrc: Infrared   SpO2: 99%   Weight: 99.8 kg (220 lb 1.6 oz)     Body mass index is 36.63 kg/mý.    Constitutional: NAD.  Eyes: EOMI. PERRLA. Normal conjunctiva.  Ear, nose, mouth, throat: No tonsillar exudates or erythema.   Normal nasal mucosa. Normal external ear canals and TMs bilaterally.  Cardiovascular: RRR. No murmurs. No LE edema b/l. Radial pulses 2+ bilaterally.  Pulmonary: CTA b/l. Good effort.  Integumentary: No rashes or wounds on face or upper extremities.  Lymphatic: No anterior cervical lymphadenopathy.  Endocrine: No thyromegaly or palpable thyroid nodules.  Psychiatric: Normal affect. Normal thought content.  Gastrointestinal: Nondistended. No hepatosplenomegaly. No focal tenderness to palpation. Normal bowel sounds.       Assessment & Plan   Assessment and Plan  Pleasant 43-year-old female with fibromyalgia, GERD, menstrual migraines, asymptomatic congenital sacral meningocele, allergic rhinitis, who presents with the following:    Diagnoses and all orders for this visit:    1. Annual physical exam (Primary): We discussed preventative care including age and patient-appropriate immunizations and cancer screening. We also discussed the importance of exercise and a healthy diet. This is their annual preventative exam.    2. Fibromyalgia: Symptoms stable, palpitations appear to be consistent with previous symptoms as well, and remain very infrequent.  They would become more frequent I would recommend further evaluation including Holter monitoring but this time watchful waiting is appropriate.  -     Vitamin D,25-Hydroxy  -     Vitamin B12  -     Sedimentation Rate    3. Menstrual migraine without status migrainosus, not intractable:  Recommend NSAIDs a day or 2 before symptoms usually occur    4. Heartburn: Controlled    5. ALT (SGPT) level raised  -     Comprehensive Metabolic Panel    6. Congenital sacral meningocele: Asymptomatic    7. Seasonal allergic rhinitis due to pollen: Controlled    8. Encounter for screening mammogram for malignant neoplasm of breast  -     Mammo Screening Digital Tomosynthesis Bilateral With CAD; Future    9. Screening for unspecified condition  -     Hemoglobin A1c  -     CBC & Differential  -     Lipid Panel  -     Thyroid Panel With TSH  -     Urinalysis With Microscopic - Urine, Clean Catch    10. Class 2 obesity due to excess calories without serious comorbidity with body mass index (BMI) of 35.0 to 35.9 in adult: Discussed weight loss medications, she has lost weight since her last appointment.     Return to office in 1 year for annual physical or earlier as needed.    Carter Ellis MD  Family Medicine  O: [phone]    Disclaimer: Parts of this note were dictated by speech recognition. Minor errors in transcription may be present. Please call if questions.

## 2023-08-08 PROBLEM — R74.01 ALT (SGPT) LEVEL RAISED: Status: RESOLVED | Noted: 2020-12-04 | Resolved: 2023-08-08

## 2023-08-08 LAB
25(OH)D3+25(OH)D2 SERPL-MCNC: 56.6 NG/ML (ref 30–100)
ALBUMIN SERPL-MCNC: 4.5 G/DL (ref 3.9–4.9)
ALBUMIN/GLOB SERPL: 1.4 {RATIO} (ref 1.2–2.2)
ALP SERPL-CCNC: 61 IU/L (ref 44–121)
ALT SERPL-CCNC: 24 IU/L (ref 0–32)
APPEARANCE UR: CLEAR
AST SERPL-CCNC: 20 IU/L (ref 0–40)
BACTERIA #/AREA URNS HPF: NORMAL /[HPF]
BASOPHILS # BLD AUTO: 0.1 X10E3/UL (ref 0–0.2)
BASOPHILS NFR BLD AUTO: 1 %
BILIRUB SERPL-MCNC: 0.3 MG/DL (ref 0–1.2)
BILIRUB UR QL STRIP: NEGATIVE
BUN SERPL-MCNC: 8 MG/DL (ref 6–24)
BUN/CREAT SERPL: 10 (ref 9–23)
CALCIUM SERPL-MCNC: 9.8 MG/DL (ref 8.7–10.2)
CASTS URNS QL MICRO: NORMAL /LPF
CHLORIDE SERPL-SCNC: 100 MMOL/L (ref 96–106)
CHOLEST SERPL-MCNC: 227 MG/DL (ref 100–199)
CO2 SERPL-SCNC: 21 MMOL/L (ref 20–29)
COLOR UR: YELLOW
CREAT SERPL-MCNC: 0.79 MG/DL (ref 0.57–1)
EGFRCR SERPLBLD CKD-EPI 2021: 96 ML/MIN/1.73
EOSINOPHIL # BLD AUTO: 0.1 X10E3/UL (ref 0–0.4)
EOSINOPHIL NFR BLD AUTO: 1 %
EPI CELLS #/AREA URNS HPF: NORMAL /HPF (ref 0–10)
ERYTHROCYTE [DISTWIDTH] IN BLOOD BY AUTOMATED COUNT: 12 % (ref 11.7–15.4)
ERYTHROCYTE [SEDIMENTATION RATE] IN BLOOD BY WESTERGREN METHOD: 16 MM/HR (ref 0–32)
FT4I SERPL CALC-MCNC: 1.9 (ref 1.2–4.9)
GLOBULIN SER CALC-MCNC: 3.2 G/DL (ref 1.5–4.5)
GLUCOSE SERPL-MCNC: 87 MG/DL (ref 70–99)
GLUCOSE UR QL STRIP: NEGATIVE
HBA1C MFR BLD: 5.3 % (ref 4.8–5.6)
HCT VFR BLD AUTO: 40.6 % (ref 34–46.6)
HDLC SERPL-MCNC: 83 MG/DL
HGB BLD-MCNC: 13.5 G/DL (ref 11.1–15.9)
HGB UR QL STRIP: NEGATIVE
IMM GRANULOCYTES # BLD AUTO: 0 X10E3/UL (ref 0–0.1)
IMM GRANULOCYTES NFR BLD AUTO: 0 %
KETONES UR QL STRIP: NEGATIVE
LDLC SERPL CALC-MCNC: 118 MG/DL (ref 0–99)
LEUKOCYTE ESTERASE UR QL STRIP: NEGATIVE
LYMPHOCYTES # BLD AUTO: 2 X10E3/UL (ref 0.7–3.1)
LYMPHOCYTES NFR BLD AUTO: 26 %
MCH RBC QN AUTO: 28.5 PG (ref 26.6–33)
MCHC RBC AUTO-ENTMCNC: 33.3 G/DL (ref 31.5–35.7)
MCV RBC AUTO: 86 FL (ref 79–97)
MICRO URNS: NORMAL
MICRO URNS: NORMAL
MONOCYTES # BLD AUTO: 0.3 X10E3/UL (ref 0.1–0.9)
MONOCYTES NFR BLD AUTO: 3 %
NEUTROPHILS # BLD AUTO: 5.2 X10E3/UL (ref 1.4–7)
NEUTROPHILS NFR BLD AUTO: 69 %
NITRITE UR QL STRIP: NEGATIVE
PH UR STRIP: 7 [PH] (ref 5–7.5)
PLATELET # BLD AUTO: 350 X10E3/UL (ref 150–450)
POTASSIUM SERPL-SCNC: 4.5 MMOL/L (ref 3.5–5.2)
PROT SERPL-MCNC: 7.7 G/DL (ref 6–8.5)
PROT UR QL STRIP: NEGATIVE
RBC # BLD AUTO: 4.74 X10E6/UL (ref 3.77–5.28)
RBC #/AREA URNS HPF: NORMAL /HPF (ref 0–2)
SODIUM SERPL-SCNC: 139 MMOL/L (ref 134–144)
SP GR UR STRIP: 1 (ref 1–1.03)
T3RU NFR SERPL: 16 % (ref 24–39)
T4 SERPL-MCNC: 11.7 UG/DL (ref 4.5–12)
TRIGL SERPL-MCNC: 153 MG/DL (ref 0–149)
TSH SERPL DL<=0.005 MIU/L-ACNC: 0.87 UIU/ML (ref 0.45–4.5)
UROBILINOGEN UR STRIP-MCNC: 0.2 MG/DL (ref 0.2–1)
VIT B12 SERPL-MCNC: 802 PG/ML (ref 232–1245)
VLDLC SERPL CALC-MCNC: 26 MG/DL (ref 5–40)
WBC # BLD AUTO: 7.5 X10E3/UL (ref 3.4–10.8)
WBC #/AREA URNS HPF: NORMAL /HPF (ref 0–5)

## 2023-08-09 NOTE — PROGRESS NOTES
Overall your bloodwork looks great with the exception of mildly elevated cholesterol    This is associated with a long-term risk of heart disease. I do not recommend a medication at this time but I do recommend regular exercise and reducing red meat and fatty food consumption.  We should recheck this annually or at least every few years.    Everything else is essentially normal including diabetes screen, metabolic panel, blood count, urinalysis, B12 and vitamin D levels, inflammatory marker (sedimentation rate), and thyroid panel. English

## 2023-09-10 DIAGNOSIS — Z30.011 ENCOUNTER FOR INITIAL PRESCRIPTION OF CONTRACEPTIVE PILLS: ICD-10-CM

## 2023-09-11 RX ORDER — DROSPIRENONE AND ETHINYL ESTRADIOL 0.02-3(28)
KIT ORAL
Qty: 84 TABLET | Refills: 3 | Status: SHIPPED | OUTPATIENT
Start: 2023-09-11

## 2023-09-11 NOTE — TELEPHONE ENCOUNTER
Rx Refill Note  Requested Prescriptions     Pending Prescriptions Disp Refills    Kelsey 3-0.02 MG per tablet [Pharmacy Med Name: KELSEY 3 MG-0.02 MG TABLET] 84 tablet 3     Sig: TAKE 1 TABLET BY MOUTH EVERY DAY      Last office visit with prescribing clinician: 8/7/2023   Last telemedicine visit with prescribing clinician: Visit date not found   Next office visit with prescribing clinician: 8/13/2024                         Would you like a call back once the refill request has been completed: [] Yes [] No    If the office needs to give you a call back, can they leave a voicemail: [] Yes [] No    Erinn Oreilly MA  09/11/23, 10:37 EDT

## 2023-11-11 DIAGNOSIS — M79.7 FIBROMYALGIA: ICD-10-CM

## 2023-11-13 RX ORDER — CYCLOBENZAPRINE HCL 5 MG
5 TABLET ORAL NIGHTLY PRN
Qty: 90 TABLET | Refills: 3 | Status: SHIPPED | OUTPATIENT
Start: 2023-11-13

## 2024-02-26 ENCOUNTER — TELEPHONE (OUTPATIENT)
Dept: INTERNAL MEDICINE | Facility: CLINIC | Age: 44
End: 2024-02-26

## 2024-02-26 ENCOUNTER — OFFICE VISIT (OUTPATIENT)
Dept: INTERNAL MEDICINE | Facility: CLINIC | Age: 44
End: 2024-02-26
Payer: COMMERCIAL

## 2024-02-26 VITALS
SYSTOLIC BLOOD PRESSURE: 122 MMHG | OXYGEN SATURATION: 97 % | BODY MASS INDEX: 36.65 KG/M2 | DIASTOLIC BLOOD PRESSURE: 80 MMHG | TEMPERATURE: 97.9 F | HEART RATE: 113 BPM | WEIGHT: 220 LBS | HEIGHT: 65 IN

## 2024-02-26 DIAGNOSIS — M79.7 FIBROMYALGIA: ICD-10-CM

## 2024-02-26 DIAGNOSIS — R00.2 PALPITATIONS: ICD-10-CM

## 2024-02-26 DIAGNOSIS — R12 HEARTBURN: Primary | ICD-10-CM

## 2024-02-26 PROCEDURE — 99214 OFFICE O/P EST MOD 30 MIN: CPT | Performed by: FAMILY MEDICINE

## 2024-02-26 RX ORDER — PANTOPRAZOLE SODIUM 40 MG/1
40 TABLET, DELAYED RELEASE ORAL DAILY
Qty: 90 TABLET | Refills: 3 | Status: SHIPPED | OUTPATIENT
Start: 2024-02-26

## 2024-02-26 NOTE — PROGRESS NOTES
"Date of Encounter: 2024  Patient: Giana Barraza,  1980    Subjective   History of Presenting Illness  Chief complaint: Follow-up recent events    Patient has been experiencing intermittent chest pain and palpitations.  First went to the emergency department 10/19 and underwent serial troponins, chest x-ray, EKG which were overall unremarkable.  Followed up with cardiology and underwent a Holter monitor and cardiac stress test, Holter monitor significant for sinus tachycardia during palpitation episodes.  Unfortunately patient  had a poor experience with Dr. Kammerling which was very upsetting and made her concerns not feel heard.    She continues to have intermittent palpitations, would have to keep a journal to discuss frequency much more but they do not appear to be provoked by any activity.  She is also having intermittent chest discomfort that feels like \"esophagus discomfort\", as well as dysphagia with certain foods like bread, despite taking her omeprazole as needed.  She is also felt increased muscle aches, a degree of anxiety, often worsened premenstrually.  She has been taking ibuprofen daily up until recently to help manage the symptoms.  She does skip the blanks on her OCPs.  She is getting monthly menstrual migraines.    The following portions of the patient's history were reviewed and updated as appropriate: allergies, current medications, past family history, past medical history, past social history, past surgical history and problem list.    Patient Active Problem List   Diagnosis    Fibromyalgia    Lipomyelomeningocele    Congenital sacral meningocele    Palpitations    Heartburn    Seasonal allergic rhinitis    Menstrual migraine without status migrainosus, not intractable    Class 2 obesity due to excess calories without serious comorbidity with body mass index (BMI) of 35.0 to 35.9 in adult     Past Medical History:   Diagnosis Date    Chest wall discomfort 2020    " "Fibromyalgia, primary     Headache     Palpitation 12/03/2020     Past Surgical History:   Procedure Laterality Date    DILATATION AND CURETTAGE       Family History   Problem Relation Age of Onset    Colon cancer Mother     No Known Problems Father     Heart disease Paternal Grandfather        Current Outpatient Medications:     BIOTIN PO, Take  by mouth., Disp: , Rfl:     cetirizine (zyrTEC) 10 MG tablet, Take 1 tablet by mouth Daily., Disp: , Rfl:     clobetasol (TEMOVATE) 0.05 % ointment, Apply  topically to the appropriate area as directed 2 (Two) Times a Day As Needed (eczema)., Disp: 15 g, Rfl: 3    cyclobenzaprine (FLEXERIL) 5 MG tablet, TAKE 1 TABLET BY MOUTH AT NIGHT AS NEEDED FOR MUSCLE SPASMS., Disp: 90 tablet, Rfl: 3    melatonin 1 MG tablet, Take  by mouth., Disp: , Rfl:     multivitamin with minerals tablet tablet, Take 1 tablet by mouth Daily., Disp: , Rfl:     Kelsey 3-0.02 MG per tablet, TAKE 1 TABLET BY MOUTH EVERY DAY, Disp: 84 tablet, Rfl: 3    PROBIOTIC PRODUCT PO, qhs, Disp: , Rfl:     VITAMIN D PO, Vitamin D  once daily, Disp: , Rfl:     pantoprazole (PROTONIX) 40 MG EC tablet, Take 1 tablet by mouth Daily., Disp: 90 tablet, Rfl: 3  No Known Allergies  Social History     Tobacco Use    Smoking status: Never    Smokeless tobacco: Never   Vaping Use    Vaping Use: Never used   Substance Use Topics    Alcohol use: Not Currently    Drug use: Never          Objective   Physical Exam  Vitals:    02/26/24 0832   BP: 122/80   BP Location: Left arm   Patient Position: Sitting   Cuff Size: Large Adult   Pulse: 113   Temp: 97.9 °F (36.6 °C)   TempSrc: Infrared   SpO2: 97%   Weight: 99.8 kg (220 lb)   Height: 165.1 cm (65\")     Body mass index is 36.61 kg/m².    Constitutional: NAD.  Pulmonary: No respiratory distress  Psychiatric: Anxious affect, congruent mood     Assessment & Plan   Assessment and Plan  Pleasant 43-year-old female with fibromyalgia, GERD, menstrual migraines, asymptomatic congenital " sacral meningocele, allergic rhinitis, who presents with the following:     Diagnoses and all orders for this visit:    1. Heartburn (Primary): Some of her chest discomfort as well as palpitations may be related to uncontrolled GERD.  With dysphagia, recommend we start pantoprazole prescription strength daily and follow-up in 2 to 3 weeks to make sure she is improving.  If not may need EGD referral.  -     pantoprazole (PROTONIX) 40 MG EC tablet; Take 1 tablet by mouth Daily.  Dispense: 90 tablet; Refill: 3    2. Palpitations: Correlated with sinus tachycardia on Holter monitor.  Discussed medications to help with anxiety and palpitations, for now patient would like to observe.    3. Fibromyalgia: Discussed Lyrica, Cymbalta as options for chronic pain fibromyalgia.  These may also help with her migraines.  She will contact me by [employer] or phone call if interested in starting these before our follow-up.    Carter Ellis MD  Family Medicine  O: [phone]    Disclaimer: Parts of this note were dictated by speech recognition. Minor errors in transcription may be present. Please call if questions.

## 2024-02-26 NOTE — TELEPHONE ENCOUNTER
Caller: Giana Barraza    Relationship to patient: Self    Best call back number: [phone]     Patient is needing: PATIENT CALLING BECAUSE [employer] IS REQUIRING A PRIOR AUTHORIZATION. IS GOING TO  TODAYS MEDICATION BECAUSE IT WILL BE $29 BUT WOULD LIKE TO HAVE PRIOR AUTHORIZATION DONE FOR FUTURE REFILLS.

## 2024-04-01 ENCOUNTER — OFFICE VISIT (OUTPATIENT)
Dept: INTERNAL MEDICINE | Facility: CLINIC | Age: 44
End: 2024-04-01
Payer: COMMERCIAL

## 2024-04-01 VITALS
WEIGHT: 212 LBS | HEIGHT: 65 IN | HEART RATE: 113 BPM | RESPIRATION RATE: 16 BRPM | DIASTOLIC BLOOD PRESSURE: 84 MMHG | BODY MASS INDEX: 35.32 KG/M2 | OXYGEN SATURATION: 96 % | TEMPERATURE: 97.8 F | SYSTOLIC BLOOD PRESSURE: 122 MMHG

## 2024-04-01 DIAGNOSIS — R12 HEARTBURN: Primary | ICD-10-CM

## 2024-04-01 DIAGNOSIS — R10.11 RUQ PAIN: ICD-10-CM

## 2024-04-01 PROCEDURE — 99213 OFFICE O/P EST LOW 20 MIN: CPT | Performed by: NURSE PRACTITIONER

## 2024-04-01 NOTE — PROGRESS NOTES
Chief Complaint  Medication concerns (Pt wanting to speak about Pantoprazole. Experiencing sharp pain in on R. Side started Thursday, pt is still sensitive on all of right abdomen. Reports that she was afraid she would have to go to ER, but pain improved.  states that she did see improvement, but that GERD is still present. )    Subjective        Giana Barraza presents to [facility]  History of Present Illness  Here with concerns over taking pantoprazole, experiencing sharp right pain right abdomen.  Acid reflux has not improved.    She was last seen in our office by Dr. Ellis on February 26, 2024 with intermittent chest pain and palpitations not related to coronary artery etiology.  Dr. Ellis prescribed pantoprazole (Protonix) 40 mg EC tablet with instructions to take 1 tablet by mouth each day.    Pain is mostly on the right lower abdomen, chronic. She has had an US on the abdomen about 4-5 years ago [place], [place]. Nothing unusual with gallbladder, liver, pancreas. He also has pain in her ribs, hurts to push on it.     She is concerned that pantoprazole was causing the pain. She took it for about 1 month, helped to ease GERD symptoms somewhat, but not completely.   She will experience the pain randomly, not related food. Always located in the right lower to mid quadrant and will radiate to back. Very nauseated, but no vomiting. No dysphagia, but she feels like she has something stuck in her throat, which is random. She has never had an EGD. Her mother passed away from some type of colon or uterine cancer. She was 48 when she passed.     She does not take a lot of NSAIDs, only when gets headaches.   Abdominal Pain  This is a recurrent problem. The current episode started 1 to 4 weeks ago. The onset quality is gradual. The problem occurs every several days. The problem has been coming and going. The pain is located in the generalized abdominal region. The pain is at a severity of  "5/10. The quality of the pain is dull and sharp. The abdominal pain radiates to the epigastric region and right flank. Associated symptoms include anorexia and nausea. Pertinent negatives include no arthralgias, belching, constipation, diarrhea, dysuria, fever, flatus, frequency, hematochezia, hematuria, melena, myalgias, vomiting or weight loss. The pain is aggravated by movement and NSAIDs. The pain is relieved by Nothing and recumbency.       Objective   Vital Signs:  /84 (BP Location: Right arm, Patient Position: Sitting, Cuff Size: Adult)   Pulse 113   Temp 97.8 °F (36.6 °C) (Infrared)   Resp 16   Ht 165.1 cm (65\")   Wt 96.2 kg (212 lb)   SpO2 96%   BMI 35.28 kg/m²   Estimated body mass index is 35.28 kg/m² as calculated from the following:    Height as of this encounter: 165.1 cm (65\").    Weight as of this encounter: 96.2 kg (212 lb).       Class 2 Severe Obesity (BMI >=35 and <=39.9). Obesity-related health conditions include the following: GERD. Obesity is unchanged.       Physical Exam  Vitals reviewed.   Constitutional:       General: She is not in acute distress.     Appearance: Normal appearance. She is obese. She is not ill-appearing, toxic-appearing or diaphoretic.   Cardiovascular:      Rate and Rhythm: Normal rate and regular rhythm.      Pulses: Normal pulses.      Heart sounds: Normal heart sounds.   Pulmonary:      Effort: Pulmonary effort is normal.      Breath sounds: Normal breath sounds.   Abdominal:      General: Bowel sounds are decreased.      Palpations: Abdomen is soft.      Tenderness: There is abdominal tenderness in the right upper quadrant. There is no right CVA tenderness or left CVA tenderness.   Musculoskeletal:        Back:    Skin:     General: Skin is warm and dry.   Neurological:      General: No focal deficit present.      Mental Status: She is alert and oriented to person, place, and time. Mental status is at baseline.   Psychiatric:         Mood and Affect: " Mood normal.         Behavior: Behavior normal.         Thought Content: Thought content normal.         Judgment: Judgment normal.        Result Review :                   Assessment and Plan   Patient is a very pleasant 43-year-old female with seasonal allergic rhinitis, history palpitations, obesity, GERD, fibromyalgia, migraines mostly related to menstrual period, history lipomyelomeningocele here with right upper quadrant pain.          Diagnoses and all orders for this visit:    1. Heartburn (Primary)    2. RUQ pain  -     US Abdomen Complete; Future  -     Hepatic Function Panel  -     Lipase    Consider referring to gastroenterology for upper EGD if US and labwork unremarkable.   Continue to use pantoprazole, taking it in the AM at least 30 minutes prior to eating or drinking.        Follow Up   No follow-ups on file.  Patient was given instructions and counseling regarding her condition or for health maintenance advice. Please see specific information pulled into the AVS if appropriate.         Answers submitted by the patient for this visit:  Primary Reason for Visit (Submitted on 3/31/2024)  What is the primary reason for your visit?: Abdominal Pain

## 2024-04-02 LAB
ALBUMIN SERPL-MCNC: 4.5 G/DL (ref 3.9–4.9)
ALP SERPL-CCNC: 55 IU/L (ref 44–121)
ALT SERPL-CCNC: 35 IU/L (ref 0–32)
AST SERPL-CCNC: 32 IU/L (ref 0–40)
BILIRUB DIRECT SERPL-MCNC: 0.12 MG/DL (ref 0–0.4)
BILIRUB SERPL-MCNC: 0.3 MG/DL (ref 0–1.2)
LIPASE SERPL-CCNC: 24 U/L (ref 14–72)
PROT SERPL-MCNC: 7.7 G/DL (ref 6–8.5)

## 2024-04-10 ENCOUNTER — TELEPHONE (OUTPATIENT)
Dept: INTERNAL MEDICINE | Facility: CLINIC | Age: 44
End: 2024-04-10
Payer: COMMERCIAL

## 2024-04-10 DIAGNOSIS — R12 HEARTBURN: ICD-10-CM

## 2024-04-10 DIAGNOSIS — R10.11 RUQ PAIN: Primary | ICD-10-CM

## 2024-04-10 NOTE — TELEPHONE ENCOUNTER
Call from patient wishing to move forward with gastro referral to see either Dr. Ntahaniel Mishra or Dr. Manan Araya    Pt has phone number to call after referral has been sent

## 2024-04-11 ENCOUNTER — HOSPITAL ENCOUNTER (OUTPATIENT)
Dept: ULTRASOUND IMAGING | Facility: HOSPITAL | Age: 44
Discharge: HOME | End: 2024-04-11
Admitting: NURSE PRACTITIONER
Payer: COMMERCIAL

## 2024-04-11 DIAGNOSIS — R10.11 RUQ PAIN: ICD-10-CM

## 2024-04-11 PROCEDURE — 76700 US EXAM ABDOM COMPLETE: CPT

## 2024-04-15 ENCOUNTER — TELEPHONE (OUTPATIENT)
Dept: INTERNAL MEDICINE | Facility: CLINIC | Age: 44
End: 2024-04-15
Payer: COMMERCIAL

## 2024-04-15 NOTE — TELEPHONE ENCOUNTER
Caller: Giana Barraza    Relationship to patient: Self    Best call back number: [phone]    Patient is needing: SHE HAS BEEN FEELING A LOT OF WEAKNESS AND SOME DIZZINESS.  SHE HAS HAD AN ULTRA SOUND BUT HASN'T HEARD ANYTHING FROM IT YET.  PLEASE GIVE HER A CALL TO DISCUSS THE SITUATION.

## 2024-04-15 NOTE — TELEPHONE ENCOUNTER
Radiology report is not back yet, when it is back, dictation will be available when provider receives report. Will make pt aware as soon as possible.    HUB TO RELAY

## 2024-04-17 ENCOUNTER — TELEPHONE (OUTPATIENT)
Dept: GASTROENTEROLOGY | Facility: CLINIC | Age: 44
End: 2024-04-17
Payer: COMMERCIAL

## 2024-04-17 ENCOUNTER — OFFICE VISIT (OUTPATIENT)
Dept: GASTROENTEROLOGY | Facility: CLINIC | Age: 44
End: 2024-04-17
Payer: COMMERCIAL

## 2024-04-17 VITALS
DIASTOLIC BLOOD PRESSURE: 76 MMHG | BODY MASS INDEX: 33.62 KG/M2 | SYSTOLIC BLOOD PRESSURE: 118 MMHG | WEIGHT: 201.8 LBS | HEIGHT: 65 IN

## 2024-04-17 DIAGNOSIS — Z80.0 FAMILY HISTORY OF COLON CANCER: ICD-10-CM

## 2024-04-17 DIAGNOSIS — R13.19 ESOPHAGEAL DYSPHAGIA: Primary | ICD-10-CM

## 2024-04-17 NOTE — TELEPHONE ENCOUNTER
Caller: Giana Barraza    Relationship to patient: Self    Best call back number: [phone]    Chief complaint: REFERRAL     Type of visit: OFFICE VISIT     If rescheduling, when is the original appointment:  06/11/24 W/ DR. FLOREZ     Additional notes: OFFICE ADVISED TO SEND ENCOUNTER FOR REFERRAL TO BE REVIEWED. PATIENT ADVISED BY PCP TO CALL [facility] TO CHECK FOR SOONER AVAILABLE APPT. [facility] BUT IS NEEDING A SOONER AVAILABLE APPT DUE TO BEING ON PANTOPRAZOLE FOR AN EXTENDED PERIOD OF TIME. RX DOSE HELP SOME BUT DOES NOT ALLEVIATE ALL SYMPTOMS. PLEASE REVIEW AND ADVISE.

## 2024-04-17 NOTE — PROGRESS NOTES
"    PATIENT INFORMATION  Giana Barraza       - 1980    CHIEF COMPLAINT  Chief Complaint   Patient presents with    Heartburn    Difficulty Swallowing       HISTORY OF PRESENT ILLNESS  Here today for evaluation of dysphagia    In February saw PCP with slow onset of dysphagia. But for as long as she can remember dense foods feel like they stick and gags until gets water. Vitamin at night started sticking. Worse in evening does not want to swallow because feels like will back up, can drink and not coughing up. Cannot burp, but feels a lot of pressure. Was using prilosec around cycle when HB flared, then started daily pantoprazole and helped, but did not completely alleviate. Severe epigastric pain occasionally, has not happened since February. Difficulty laying down, does not eat a lot at once. NSAIDs, was taking aleve nightly for a long time, now PRN.    Diagnosed with fibromyalgia.  2024 normal abd US    Bowels have slowed a little, hard to loose, bowels 6 of 7 days, not usually hard. Not taking fiber supplement. Was more complete with metamucil so will resume.     No previous colonoscopy. Mom passed at 48 and was told was rare cancer that only forms in uterus or colon? Believes started in colon. Father with normal colons.    Heartburn  She complains of abdominal pain (Epigastric pain) and nausea. Associated symptoms include fatigue.   Difficulty Swallowing  Associated symptoms include abdominal pain (Epigastric pain), arthralgias, fatigue, headaches, nausea and weakness. Pertinent negatives include no vomiting.       REVIEWED PERTINENT RESULTS/ LABS  No results found for: \"CASEREPORT\", \"FINALDX\"  Lab Results   Component Value Date    HGB 14.0 10/19/2023    MCV 87.2 10/19/2023     10/19/2023    ALT 35 (H) 2024    AST 32 2024    HGBA1C 5.3 2023    INR 1.0 10/19/2023    TRIG 153 (H) 2023      US Abdomen Complete    Result Date: 4/15/2024  Narrative: US ABDOMEN COMPLETE-  Date " of Exam: 4/11/2024 9:30 AM  Indication: Right upper quadrant abdominal pain.  Comparison: None available.  Technique: Grayscale and color doppler ultrasound images of the abdomen were obtained.  FINDINGS: The liver demonstrates normal echogenicity and echotexture. No focal liver lesion is identified. No intrahepatic biliary duct dilatation. The portal vein demonstrates normal hepatopedal flow.  The gallbladder is normal in ultrasound appearance. No gallstones, gallbladder wall thickening, or pericholecystic fluid.  The common bile duct measures 4 mm in maximal diameter.  The visualized portions of the head and body of the pancreas are normal.  Both kidneys are normal in ultrasound appearance. No solid renal mass, shadowing stone, or hydronephrosis. The right kidney measures 10.8 cm in diameter. The left kidney measures 11.2 cm in diameter.  The spleen is normal in ultrasound appearance.  The spleen measures 9.6 cm in diameter.  Visualized portions of the abdominal aorta and IVC are within normal limits. The proximal abdominal aorta measures 1.9 cm in diameter. The mid abdominal aorta measures 1.5 cm in diameter. The distal abdominal aorta measures 1.3 cm in diameter. The right common iliac artery measures 1 cm in diameter. The left common iliac artery measures 1 cm in diameter.  No free fluid is seen in the abdomen.      Impression: Unremarkable abdominal ultrasound.   This report was finalized on 4/15/2024 3:34 PM by Ronak Burch MD on Workstation: [id number]       REVIEW OF SYSTEMS  Review of Systems   Constitutional:  Positive for fatigue.   HENT:  Positive for trouble swallowing.    Eyes: Negative.    Respiratory: Negative.     Cardiovascular: Negative.    Gastrointestinal:  Positive for abdominal pain (Epigastric pain) and nausea. Negative for constipation, diarrhea and vomiting.        GERD   Endocrine: Negative.    Genitourinary: Negative.    Musculoskeletal:  Positive for arthralgias.   Skin: Negative.     Allergic/Immunologic: Negative.    Neurological:  Positive for weakness and headaches.   Hematological: Negative.    Psychiatric/Behavioral: Negative.           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]          PAST MEDICAL HISTORY  Past Medical History:   Diagnosis Date    Chest wall discomfort 12/03/2020    Fibromyalgia, primary     GERD (gastroesophageal reflux disease) Aug 2021    Headache     Palpitation 12/03/2020         SURGICAL HISTORY  Past Surgical History:   Procedure Laterality Date    DILATATION AND CURETTAGE      HERNIA REPAIR      Months after birth         FAMILY HISTORY  Family History   Problem Relation Age of Onset    Colon cancer Mother     No Known Problems Father     Heart disease Paternal Grandfather          SOCIAL HISTORY  Social History     Occupational History    Not on file   Tobacco Use    Smoking status: Never    Smokeless tobacco: Never   Vaping Use    Vaping status: Never Used   Substance and Sexual Activity    Alcohol use: Not Currently    Drug use: Never    Sexual activity: Defer         CURRENT MEDICATIONS    Current Outpatient Medications:     BIOTIN PO, Take  by mouth., Disp: , Rfl:     cetirizine (zyrTEC) 10 MG tablet, Take 1 tablet by mouth Daily., Disp: , Rfl:     clobetasol (TEMOVATE) 0.05 % ointment, Apply  topically to the appropriate area as directed 2 (Two) Times a Day As Needed (eczema)., Disp: 15 g, Rfl: 3    cyclobenzaprine (FLEXERIL) 5 MG tablet, TAKE 1 TABLET BY MOUTH AT NIGHT AS NEEDED FOR MUSCLE SPASMS., Disp: 90 tablet, Rfl: 3    melatonin 1 MG  "tablet, Take  by mouth., Disp: , Rfl:     multivitamin with minerals tablet tablet, Take 1 tablet by mouth Daily., Disp: , Rfl:     Kelsey 3-0.02 MG per tablet, TAKE 1 TABLET BY MOUTH EVERY DAY, Disp: 84 tablet, Rfl: 3    pantoprazole (PROTONIX) 40 MG EC tablet, Take 1 tablet by mouth Daily., Disp: 90 tablet, Rfl: 3    PROBIOTIC PRODUCT PO, qhs, Disp: , Rfl:     VITAMIN D PO, Vitamin D  once daily, Disp: , Rfl:     ALLERGIES  Patient has no known allergies.    VITALS  Vitals:    04/17/24 1338   BP: 118/76   BP Location: Left arm   Patient Position: Sitting   Cuff Size: Large Adult   Weight: 91.5 kg (201 lb 12.8 oz)   Height: 165.1 cm (65\")       PHYSICAL EXAM  Debilities/Disabilities Identified: None  Emotional Behavior: Appropriate  Wt Readings from Last 3 Encounters:   04/17/24 91.5 kg (201 lb 12.8 oz)   04/01/24 96.2 kg (212 lb)   02/26/24 99.8 kg (220 lb)     Ht Readings from Last 1 Encounters:   04/17/24 165.1 cm (65\")     Body mass index is 33.58 kg/m².  Physical Exam  Constitutional:       General: She is not in acute distress.     Appearance: Normal appearance. She is not ill-appearing.   HENT:      Head: Normocephalic and atraumatic.      Mouth/Throat:      Mouth: Mucous membranes are moist.      Pharynx: No posterior oropharyngeal erythema.   Eyes:      General: No scleral icterus.  Cardiovascular:      Rate and Rhythm: Normal rate and regular rhythm.      Heart sounds: Normal heart sounds.   Pulmonary:      Effort: Pulmonary effort is normal.      Breath sounds: Normal breath sounds.   Abdominal:      General: Abdomen is flat. Bowel sounds are normal. There is no distension.      Palpations: Abdomen is soft. There is no mass.      Tenderness: There is no abdominal tenderness. There is no guarding or rebound. Negative signs include Rodgers's sign.      Hernia: No hernia is present.   Musculoskeletal:      Cervical back: Neck supple.   Skin:     General: Skin is warm.      Capillary Refill: Capillary refill " takes less than 2 seconds.   Neurological:      General: No focal deficit present.      Mental Status: She is alert and oriented to person, place, and time.   Psychiatric:         Mood and Affect: Mood normal.         Behavior: Behavior normal.         Thought Content: Thought content normal.         Judgment: Judgment normal.         CLINICAL DATA REVIEWED   reviewed previous lab results and integrated with today's visit, reviewed notes from other physicians and/or last GI encounter, reviewed previous endoscopy results and available photos, reviewed surgical pathology results from previous biopsies    ASSESSMENT  Diagnoses and all orders for this visit:    Esophageal dysphagia  -     Case Request; Standing  -     Case Request    Family history of colon cancer  -     Case Request; Standing  -     Case Request    Other orders  -     Follow Anesthesia Guidelines / Protocol; Future  -     Obtain Informed Consent; Standing          PLAN    Avoid NSAIDs  Continue daily PPI  Daily fiber supplement    Return in about 3 months (around 7/17/2024).    I have discussed the above plan with the patient.  They verbalize understanding and are in agreement with the plan.  They have been advised to contact the office for any questions, concerns, or changes related to their health.

## 2024-04-17 NOTE — H&P (VIEW-ONLY)
"    PATIENT INFORMATION  Giana Barraza       - 1980    CHIEF COMPLAINT  Chief Complaint   Patient presents with    Heartburn    Difficulty Swallowing       HISTORY OF PRESENT ILLNESS  Here today for evaluation of dysphagia    In February saw PCP with slow onset of dysphagia. But for as long as she can remember dense foods feel like they stick and gags until gets water. Vitamin at night started sticking. Worse in evening does not want to swallow because feels like will back up, can drink and not coughing up. Cannot burp, but feels a lot of pressure. Was using prilosec around cycle when HB flared, then started daily pantoprazole and helped, but did not completely alleviate. Severe epigastric pain occasionally, has not happened since February. Difficulty laying down, does not eat a lot at once. NSAIDs, was taking aleve nightly for a long time, now PRN.    Diagnosed with fibromyalgia.  2024 normal abd US    Bowels have slowed a little, hard to loose, bowels 6 of 7 days, not usually hard. Not taking fiber supplement. Was more complete with metamucil so will resume.     No previous colonoscopy. Mom passed at 48 and was told was rare cancer that only forms in uterus or colon? Believes started in colon. Father with normal colons.    Heartburn  She complains of abdominal pain (Epigastric pain) and nausea. Associated symptoms include fatigue.   Difficulty Swallowing  Associated symptoms include abdominal pain (Epigastric pain), arthralgias, fatigue, headaches, nausea and weakness. Pertinent negatives include no vomiting.       REVIEWED PERTINENT RESULTS/ LABS  No results found for: \"CASEREPORT\", \"FINALDX\"  Lab Results   Component Value Date    HGB 14.0 10/19/2023    MCV 87.2 10/19/2023     10/19/2023    ALT 35 (H) 2024    AST 32 2024    HGBA1C 5.3 2023    INR 1.0 10/19/2023    TRIG 153 (H) 2023      US Abdomen Complete    Result Date: 4/15/2024  Narrative: US ABDOMEN COMPLETE-  Date " of Exam: 4/11/2024 9:30 AM  Indication: Right upper quadrant abdominal pain.  Comparison: None available.  Technique: Grayscale and color doppler ultrasound images of the abdomen were obtained.  FINDINGS: The liver demonstrates normal echogenicity and echotexture. No focal liver lesion is identified. No intrahepatic biliary duct dilatation. The portal vein demonstrates normal hepatopedal flow.  The gallbladder is normal in ultrasound appearance. No gallstones, gallbladder wall thickening, or pericholecystic fluid.  The common bile duct measures 4 mm in maximal diameter.  The visualized portions of the head and body of the pancreas are normal.  Both kidneys are normal in ultrasound appearance. No solid renal mass, shadowing stone, or hydronephrosis. The right kidney measures 10.8 cm in diameter. The left kidney measures 11.2 cm in diameter.  The spleen is normal in ultrasound appearance.  The spleen measures 9.6 cm in diameter.  Visualized portions of the abdominal aorta and IVC are within normal limits. The proximal abdominal aorta measures 1.9 cm in diameter. The mid abdominal aorta measures 1.5 cm in diameter. The distal abdominal aorta measures 1.3 cm in diameter. The right common iliac artery measures 1 cm in diameter. The left common iliac artery measures 1 cm in diameter.  No free fluid is seen in the abdomen.      Impression: Unremarkable abdominal ultrasound.   This report was finalized on 4/15/2024 3:34 PM by Ronak Burch MD on Workstation: [id number]       REVIEW OF SYSTEMS  Review of Systems   Constitutional:  Positive for fatigue.   HENT:  Positive for trouble swallowing.    Eyes: Negative.    Respiratory: Negative.     Cardiovascular: Negative.    Gastrointestinal:  Positive for abdominal pain (Epigastric pain) and nausea. Negative for constipation, diarrhea and vomiting.        GERD   Endocrine: Negative.    Genitourinary: Negative.    Musculoskeletal:  Positive for arthralgias.   Skin: Negative.     Allergic/Immunologic: Negative.    Neurological:  Positive for weakness and headaches.   Hematological: Negative.    Psychiatric/Behavioral: Negative.           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]          PAST MEDICAL HISTORY  Past Medical History:   Diagnosis Date    Chest wall discomfort 12/03/2020    Fibromyalgia, primary     GERD (gastroesophageal reflux disease) Aug 2021    Headache     Palpitation 12/03/2020         SURGICAL HISTORY  Past Surgical History:   Procedure Laterality Date    DILATATION AND CURETTAGE      HERNIA REPAIR      Months after birth         FAMILY HISTORY  Family History   Problem Relation Age of Onset    Colon cancer Mother     No Known Problems Father     Heart disease Paternal Grandfather          SOCIAL HISTORY  Social History     Occupational History    Not on file   Tobacco Use    Smoking status: Never    Smokeless tobacco: Never   Vaping Use    Vaping status: Never Used   Substance and Sexual Activity    Alcohol use: Not Currently    Drug use: Never    Sexual activity: Defer         CURRENT MEDICATIONS    Current Outpatient Medications:     BIOTIN PO, Take  by mouth., Disp: , Rfl:     cetirizine (zyrTEC) 10 MG tablet, Take 1 tablet by mouth Daily., Disp: , Rfl:     clobetasol (TEMOVATE) 0.05 % ointment, Apply  topically to the appropriate area as directed 2 (Two) Times a Day As Needed (eczema)., Disp: 15 g, Rfl: 3    cyclobenzaprine (FLEXERIL) 5 MG tablet, TAKE 1 TABLET BY MOUTH AT NIGHT AS NEEDED FOR MUSCLE SPASMS., Disp: 90 tablet, Rfl: 3    melatonin 1 MG  "tablet, Take  by mouth., Disp: , Rfl:     multivitamin with minerals tablet tablet, Take 1 tablet by mouth Daily., Disp: , Rfl:     Kelsey 3-0.02 MG per tablet, TAKE 1 TABLET BY MOUTH EVERY DAY, Disp: 84 tablet, Rfl: 3    pantoprazole (PROTONIX) 40 MG EC tablet, Take 1 tablet by mouth Daily., Disp: 90 tablet, Rfl: 3    PROBIOTIC PRODUCT PO, qhs, Disp: , Rfl:     VITAMIN D PO, Vitamin D  once daily, Disp: , Rfl:     ALLERGIES  Patient has no known allergies.    VITALS  Vitals:    04/17/24 1338   BP: 118/76   BP Location: Left arm   Patient Position: Sitting   Cuff Size: Large Adult   Weight: 91.5 kg (201 lb 12.8 oz)   Height: 165.1 cm (65\")       PHYSICAL EXAM  Debilities/Disabilities Identified: None  Emotional Behavior: Appropriate  Wt Readings from Last 3 Encounters:   04/17/24 91.5 kg (201 lb 12.8 oz)   04/01/24 96.2 kg (212 lb)   02/26/24 99.8 kg (220 lb)     Ht Readings from Last 1 Encounters:   04/17/24 165.1 cm (65\")     Body mass index is 33.58 kg/m².  Physical Exam  Constitutional:       General: She is not in acute distress.     Appearance: Normal appearance. She is not ill-appearing.   HENT:      Head: Normocephalic and atraumatic.      Mouth/Throat:      Mouth: Mucous membranes are moist.      Pharynx: No posterior oropharyngeal erythema.   Eyes:      General: No scleral icterus.  Cardiovascular:      Rate and Rhythm: Normal rate and regular rhythm.      Heart sounds: Normal heart sounds.   Pulmonary:      Effort: Pulmonary effort is normal.      Breath sounds: Normal breath sounds.   Abdominal:      General: Abdomen is flat. Bowel sounds are normal. There is no distension.      Palpations: Abdomen is soft. There is no mass.      Tenderness: There is no abdominal tenderness. There is no guarding or rebound. Negative signs include Rodgers's sign.      Hernia: No hernia is present.   Musculoskeletal:      Cervical back: Neck supple.   Skin:     General: Skin is warm.      Capillary Refill: Capillary refill " takes less than 2 seconds.   Neurological:      General: No focal deficit present.      Mental Status: She is alert and oriented to person, place, and time.   Psychiatric:         Mood and Affect: Mood normal.         Behavior: Behavior normal.         Thought Content: Thought content normal.         Judgment: Judgment normal.         CLINICAL DATA REVIEWED   reviewed previous lab results and integrated with today's visit, reviewed notes from other physicians and/or last GI encounter, reviewed previous endoscopy results and available photos, reviewed surgical pathology results from previous biopsies    ASSESSMENT  Diagnoses and all orders for this visit:    Esophageal dysphagia  -     Case Request; Standing  -     Case Request    Family history of colon cancer  -     Case Request; Standing  -     Case Request    Other orders  -     Follow Anesthesia Guidelines / Protocol; Future  -     Obtain Informed Consent; Standing          PLAN    Avoid NSAIDs  Continue daily PPI  Daily fiber supplement    Return in about 3 months (around 7/17/2024).    I have discussed the above plan with the patient.  They verbalize understanding and are in agreement with the plan.  They have been advised to contact the office for any questions, concerns, or changes related to their health.

## 2024-04-19 ENCOUNTER — TELEPHONE (OUTPATIENT)
Dept: GASTROENTEROLOGY | Facility: CLINIC | Age: 44
End: 2024-04-19
Payer: COMMERCIAL

## 2024-04-19 PROBLEM — R13.19 ESOPHAGEAL DYSPHAGIA: Status: ACTIVE | Noted: 2024-04-17

## 2024-04-19 PROBLEM — Z80.0 FAMILY HISTORY OF COLON CANCER: Status: ACTIVE | Noted: 2024-04-17

## 2024-04-19 NOTE — TELEPHONE ENCOUNTER
Called and spoke with patient.  Scheduled at [facility] 04/29/2024 arrive at 10:30am.  Email prep instructions    [email or web address]

## 2024-04-26 ENCOUNTER — TELEPHONE (OUTPATIENT)
Dept: INTERNAL MEDICINE | Facility: CLINIC | Age: 44
End: 2024-04-26
Payer: COMMERCIAL

## 2024-04-26 ENCOUNTER — ANESTHESIA EVENT (OUTPATIENT)
Dept: PERIOP | Facility: HOSPITAL | Age: 44
End: 2024-04-26
Payer: COMMERCIAL

## 2024-04-26 NOTE — TELEPHONE ENCOUNTER
Caller: Rochelle Barrazanifer    Relationship: Self    Best call back number: [phone]     What is the best time to reach you: ANY TIME    Who are you requesting to speak with DR. ROSARIO OR CLINICAL STAFF      What was the call regarding: PATIENT SENT [employer] MESSAGE ABOUT NSAID ALTERNATIVES ON 4/19/2024 AND STILL HAS NOT RECEIVED A REPLY.  SHE IS HAVING HORRIBLE MIGRAINES.  SHE HAD A TORIDOL SHOT LAST WEEK AT URGENT TREATMENT AND IT DIDN'T HELP.  SHE IS HOPING TO GET SOME ANSWERS FROM DR. ROSARIO QUICKLY

## 2024-04-29 ENCOUNTER — HOSPITAL ENCOUNTER (OUTPATIENT)
Facility: HOSPITAL | Age: 44
Setting detail: HOSPITAL OUTPATIENT SURGERY
Discharge: HOME | End: 2024-04-29
Attending: INTERNAL MEDICINE | Admitting: INTERNAL MEDICINE
Payer: COMMERCIAL

## 2024-04-29 ENCOUNTER — ANESTHESIA (OUTPATIENT)
Dept: PERIOP | Facility: HOSPITAL | Age: 44
End: 2024-04-29
Payer: COMMERCIAL

## 2024-04-29 VITALS
BODY MASS INDEX: 32.32 KG/M2 | OXYGEN SATURATION: 97 % | RESPIRATION RATE: 16 BRPM | SYSTOLIC BLOOD PRESSURE: 139 MMHG | WEIGHT: 194.2 LBS | HEART RATE: 78 BPM | DIASTOLIC BLOOD PRESSURE: 81 MMHG | TEMPERATURE: 98.1 F

## 2024-04-29 DIAGNOSIS — R13.19 ESOPHAGEAL DYSPHAGIA: ICD-10-CM

## 2024-04-29 DIAGNOSIS — Z80.0 FAMILY HISTORY OF COLON CANCER: ICD-10-CM

## 2024-04-29 DIAGNOSIS — R12 HEARTBURN: ICD-10-CM

## 2024-04-29 PROCEDURE — 25010000002 PROPOFOL 200 MG/20ML EMULSION: Performed by: NURSE ANESTHETIST, CERTIFIED REGISTERED

## 2024-04-29 PROCEDURE — 25810000003 LACTATED RINGERS PER 1000 ML: Performed by: NURSE ANESTHETIST, CERTIFIED REGISTERED

## 2024-04-29 PROCEDURE — 88305 TISSUE EXAM BY PATHOLOGIST: CPT | Performed by: INTERNAL MEDICINE

## 2024-04-29 PROCEDURE — 43239 EGD BIOPSY SINGLE/MULTIPLE: CPT | Performed by: INTERNAL MEDICINE

## 2024-04-29 PROCEDURE — 45378 DIAGNOSTIC COLONOSCOPY: CPT | Performed by: INTERNAL MEDICINE

## 2024-04-29 RX ORDER — PROPOFOL 10 MG/ML
INJECTION, EMULSION INTRAVENOUS AS NEEDED
Status: DISCONTINUED | OUTPATIENT
Start: 2024-04-29 | End: 2024-04-29 | Stop reason: SURG

## 2024-04-29 RX ORDER — SODIUM CHLORIDE 0.9 % (FLUSH) 0.9 %
10 SYRINGE (ML) INJECTION EVERY 12 HOURS SCHEDULED
Status: DISCONTINUED | OUTPATIENT
Start: 2024-04-29 | End: 2024-04-29 | Stop reason: HOSPADM

## 2024-04-29 RX ORDER — SODIUM CHLORIDE, SODIUM LACTATE, POTASSIUM CHLORIDE, CALCIUM CHLORIDE 600; 310; 30; 20 MG/100ML; MG/100ML; MG/100ML; MG/100ML
9 INJECTION, SOLUTION INTRAVENOUS CONTINUOUS PRN
Status: DISCONTINUED | OUTPATIENT
Start: 2024-04-29 | End: 2024-04-29 | Stop reason: HOSPADM

## 2024-04-29 RX ORDER — PANTOPRAZOLE SODIUM 40 MG/1
40 TABLET, DELAYED RELEASE ORAL
Qty: 180 TABLET | Refills: 3 | Status: SHIPPED | OUTPATIENT
Start: 2024-04-29

## 2024-04-29 RX ORDER — ONDANSETRON 2 MG/ML
4 INJECTION INTRAMUSCULAR; INTRAVENOUS ONCE AS NEEDED
Status: DISCONTINUED | OUTPATIENT
Start: 2024-04-29 | End: 2024-04-29 | Stop reason: HOSPADM

## 2024-04-29 RX ORDER — SODIUM CHLORIDE, SODIUM LACTATE, POTASSIUM CHLORIDE, CALCIUM CHLORIDE 600; 310; 30; 20 MG/100ML; MG/100ML; MG/100ML; MG/100ML
100 INJECTION, SOLUTION INTRAVENOUS ONCE
Status: DISCONTINUED | OUTPATIENT
Start: 2024-04-29 | End: 2024-04-29 | Stop reason: HOSPADM

## 2024-04-29 RX ORDER — SODIUM CHLORIDE 9 MG/ML
40 INJECTION, SOLUTION INTRAVENOUS AS NEEDED
Status: DISCONTINUED | OUTPATIENT
Start: 2024-04-29 | End: 2024-04-29 | Stop reason: HOSPADM

## 2024-04-29 RX ORDER — SODIUM CHLORIDE 0.9 % (FLUSH) 0.9 %
10 SYRINGE (ML) INJECTION AS NEEDED
Status: DISCONTINUED | OUTPATIENT
Start: 2024-04-29 | End: 2024-04-29 | Stop reason: HOSPADM

## 2024-04-29 RX ORDER — LIDOCAINE HYDROCHLORIDE 20 MG/ML
INJECTION, SOLUTION INFILTRATION; PERINEURAL AS NEEDED
Status: DISCONTINUED | OUTPATIENT
Start: 2024-04-29 | End: 2024-04-29 | Stop reason: SURG

## 2024-04-29 RX ADMIN — PROPOFOL 50 MG: 10 INJECTION, EMULSION INTRAVENOUS at 10:55

## 2024-04-29 RX ADMIN — PROPOFOL 50 MG: 10 INJECTION, EMULSION INTRAVENOUS at 10:59

## 2024-04-29 RX ADMIN — PROPOFOL 50 MG: 10 INJECTION, EMULSION INTRAVENOUS at 10:52

## 2024-04-29 RX ADMIN — LIDOCAINE HYDROCHLORIDE 100 MG: 20 INJECTION, SOLUTION INFILTRATION; PERINEURAL at 10:41

## 2024-04-29 RX ADMIN — PROPOFOL 100 MG: 10 INJECTION, EMULSION INTRAVENOUS at 10:46

## 2024-04-29 RX ADMIN — SODIUM CHLORIDE, POTASSIUM CHLORIDE, SODIUM LACTATE AND CALCIUM CHLORIDE 9 ML/HR: 600; 310; 30; 20 INJECTION, SOLUTION INTRAVENOUS at 10:30

## 2024-04-29 RX ADMIN — PROPOFOL 50 MG: 10 INJECTION, EMULSION INTRAVENOUS at 10:49

## 2024-04-29 NOTE — ANESTHESIA POSTPROCEDURE EVALUATION
Patient: Giana Barraza    Procedure Summary       Date: 04/29/24 Room / Location: [facility] ENDOSCOPY 1 /  [facility]    Anesthesia Start: 1038 Anesthesia Stop: 1114    Procedures:       ESOPHAGOGASTRODUODENOSCOPY WITH BIOPSY (Esophagus)      COLONOSCOPY Diagnosis:       Esophageal dysphagia      Family history of colon cancer      Erosive esophagitis      Diverticulosis      (Esophageal dysphagia [R13.19])      (Family history of colon cancer [Z80.0])    Surgeons: Avery Francois MD Provider: Roz Greco CRNA    Anesthesia Type: MAC ASA Status: 2            Anesthesia Type: MAC    Vitals  Vitals Value Taken Time   /81 04/29/24 1150   Temp     Pulse 78 04/29/24 1155   Resp 18 04/29/24 1117   SpO2 97 % 04/29/24 1155   Vitals shown include unfiled device data.        Post Anesthesia Care and Evaluation    Patient location during evaluation: PHASE II  Patient participation: complete - patient participated  Level of consciousness: awake  Pain score: 0  Pain management: adequate    Airway patency: patent  Anesthetic complications: No anesthetic complications  PONV Status: none  Cardiovascular status: acceptable  Respiratory status: acceptable  Hydration status: acceptable

## 2024-04-29 NOTE — ANESTHESIA PREPROCEDURE EVALUATION
Anesthesia Evaluation     Patient summary reviewed and Nursing notes reviewed   NPO Solid Status: > 8 hours  NPO Liquid Status: > 8 hours           Airway   Mallampati: II  TM distance: >3 FB  Neck ROM: full  No difficulty expected  Dental - normal exam     Pulmonary - negative pulmonary ROS   Cardiovascular   Exercise tolerance: good (4-7 METS)    ECG reviewed  Rhythm: regular  Rate: normal        Neuro/Psych- negative ROS  GI/Hepatic/Renal/Endo    (+) obesity, GERD poorly controlled    Musculoskeletal     (+) myalgias (fibromyalgia)  Abdominal    Substance History - negative use     OB/GYN          Other        ROS/Med Hx Other: CG 12 Lead   Date/Time: 12/3/2020 9:33 AM  Performed by: Carter Ellis MD  Authorized by: Carter Ellis MD   Comparison: not compared with previous ECG   Rhythm: sinus rhythm  Rate: normal  Conduction: conduction normal  ST Segments: ST segments normal  T Waves: T waves normal  QRS axis: normal  Other: no other findings    Clinical impression: normal ECG       In context of normal EKG, recent normal chest x-ray, and physical exam, suspect chest discomfort related to fibromyalgia with costochondritis, or less likely silent heartburn due to a getting worse particularly when laying flat and during naps after lunch.  This time since it is improving I would prefer to watchful wait, take anti-inflammatories and muscle relaxers as needed, and try napping upright or taking a Tums with lunch.  Patient will update me via MyChart.  If worsening or symptoms change, will consider Holter monitor or stress testing.                     Anesthesia Plan    ASA 2     MAC     intravenous induction     Anesthetic plan, risks, benefits, and alternatives have been provided, discussed and informed consent has been obtained with: patient.    Use of blood products discussed with patient  Consented to blood products.    Plan discussed with CRNA.    CODE STATUS:

## 2024-04-29 NOTE — TELEPHONE ENCOUNTER
This needs an appointment re: migraines and palpitations, not appropriate for mychart message management  She is ok to  nurtec sample if she wants to try before that appt

## 2024-04-29 NOTE — BRIEF OP NOTE
ESOPHAGOGASTRODUODENOSCOPY WITH BIOPSY, COLONOSCOPY  Progress Note    Giana Barraza  4/29/2024    Pre-op Diagnosis:   Esophageal dysphagia [R13.19]  Family history of colon cancer [Z80.0]       Post-Op Diagnosis Codes:     * Esophageal dysphagia [R13.19]     * Family history of colon cancer [Z80.0]     * Erosive esophagitis [K22.10]     * Diverticulosis [K57.90]    Procedure/CPT® Codes:        Procedure(s):  ESOPHAGOGASTRODUODENOSCOPY WITH BIOPSY  COLONOSCOPY              Surgeon(s):  Avery Francois MD    Anesthesia: Monitored Anesthesia Care    Staff:   Circulator: Lyudmila Charles RN  Scrub Person: Alexandra Tejeda         Estimated Blood Loss: none    Urine Voided: * No values recorded between 4/29/2024 10:36 AM and 4/29/2024 11:11 AM *    Specimens:                Specimens       ID Source Type Tests Collected By Collected At Frozen?    A Gastric, Antrum Tissue TISSUE PATHOLOGY EXAM   Avery Francois MD 4/29/24 1054     Description: Gastric biopsies    B Esophagus, Distal Tissue TISSUE PATHOLOGY EXAM   Avery Francois MD 4/29/24 1054     Description: Biopsies                  Drains: * No LDAs found *    Findings: Normal Duodenum  Normal Stomach-Biopsy  Erosive Esophagitis-Biopsy    Colon to TI Good PRep  Rare Colon Diverticulum         Complications: none          Avery Francois MD     Date: 4/29/2024  Time: 11:14 EDT

## 2024-04-29 NOTE — INTERVAL H&P NOTE
Vital Signs  /90 (BP Location: Left arm, Patient Position: Lying)   Pulse 107   Temp 98.1 °F (36.7 °C) (Oral)   Resp 21   Wt 88.1 kg (194 lb 3.2 oz)   LMP 04/29/2024   SpO2 96%   BMI 32.32 kg/m²     H&P reviewed. The patient was examined and there are no changes to the H&P.

## 2024-05-02 LAB
LAB AP CASE REPORT: NORMAL
PATH REPORT.FINAL DX SPEC: NORMAL
PATH REPORT.GROSS SPEC: NORMAL

## 2024-05-03 ENCOUNTER — TELEPHONE (OUTPATIENT)
Dept: INTERNAL MEDICINE | Facility: CLINIC | Age: 44
End: 2024-05-03
Payer: COMMERCIAL

## 2024-05-03 NOTE — TELEPHONE ENCOUNTER
Pt calling stating that she is still suffering from a very severe headache. Pt states that she just recently had a colonoscopy and was advised that she isnt supposed to take an excess of NSAIDS. Pt states that this is all she is able to survive on right now. States that Toradol has been given before and didn't help. I advised that we could get pt in for a visit with NP, but that we also have NSAIDS and Toradol. Advised pt go to ER so they could accommodate recent med administration.     Pt understanding, wants to keep Monday appt.

## 2024-05-06 ENCOUNTER — OFFICE VISIT (OUTPATIENT)
Dept: INTERNAL MEDICINE | Facility: CLINIC | Age: 44
End: 2024-05-06
Payer: COMMERCIAL

## 2024-05-06 VITALS
BODY MASS INDEX: 32.36 KG/M2 | SYSTOLIC BLOOD PRESSURE: 132 MMHG | HEIGHT: 65 IN | DIASTOLIC BLOOD PRESSURE: 78 MMHG | WEIGHT: 194.22 LBS | HEART RATE: 108 BPM | TEMPERATURE: 97.5 F | OXYGEN SATURATION: 99 %

## 2024-05-06 DIAGNOSIS — G43.E01 CHRONIC MIGRAINE WITH AURA AND WITH STATUS MIGRAINOSUS, NOT INTRACTABLE: Primary | ICD-10-CM

## 2024-05-06 PROCEDURE — 99214 OFFICE O/P EST MOD 30 MIN: CPT | Performed by: FAMILY MEDICINE

## 2024-05-06 RX ORDER — SUMATRIPTAN 50 MG/1
TABLET, FILM COATED ORAL
Qty: 12 TABLET | Refills: 3 | Status: SHIPPED | OUTPATIENT
Start: 2024-05-06

## 2024-05-09 ENCOUNTER — TELEPHONE (OUTPATIENT)
Dept: INTERNAL MEDICINE | Facility: CLINIC | Age: 44
End: 2024-05-09
Payer: COMMERCIAL

## 2024-05-10 ENCOUNTER — TELEPHONE (OUTPATIENT)
Dept: INTERNAL MEDICINE | Facility: CLINIC | Age: 44
End: 2024-05-10

## 2024-05-10 DIAGNOSIS — G43.E01 CHRONIC MIGRAINE WITH AURA AND WITH STATUS MIGRAINOSUS, NOT INTRACTABLE: Primary | ICD-10-CM

## 2024-05-10 NOTE — TELEPHONE ENCOUNTER
Caller: Giana Barraza    Relationship: Self    Best call back number: [phone]    What is the best time to reach you: ANY     Who are you requesting to speak with (clinical staff, provider,  specific staff member): PCP        What was the call regarding: TOOK IMITREX MONDAY AND TUESDAY NOTHING ON WEDNESDAY  UBRELVY THURSDAY TWICE NOTHING TODAY BUT  NEITHER ARE WORKING    PATIENT HAS GONE TO THE EYE DOCTOR AND HE HAS ASSURED HER THAT HE DOESN'T SEE ANY REASON ON HIS END THAT WOULD BE CAUSING MIGRAINES.  PLEASE CONTACT PATIENT AND LET HER KNOW WHAT NEXT STEPS SHOULD BE.

## 2024-05-13 RX ORDER — AMITRIPTYLINE HYDROCHLORIDE 10 MG/1
TABLET, FILM COATED ORAL
Qty: 60 TABLET | Refills: 1 | Status: SHIPPED | OUTPATIENT
Start: 2024-05-13 | End: 2024-05-21

## 2024-05-13 RX ORDER — BUTALBITAL, ACETAMINOPHEN AND CAFFEINE 50; 325; 40 MG/1; MG/1; MG/1
1 TABLET ORAL EVERY 6 HOURS PRN
Qty: 30 TABLET | Refills: 0 | Status: SHIPPED | OUTPATIENT
Start: 2024-05-13

## 2024-05-14 NOTE — TELEPHONE ENCOUNTER
Please call pt with the following:    -Going to send in fioricet. Butalbital-caffeine-asa combination that can break severe headaches but should not be used in excess.  -Going to send in low dose amitriptyline. This is a TCA that when taken nightly can resolve and prevent chronic headaches. It can make you sleepy, but usually there are no other effect at this dose and it is often used as a sleep aid.   -I have ordered a MRI of the brain which will give us a more detailed picture than the CT. They should call to schedule.  -Please have her schedule a follow up appt for about 1-2 weeks out

## 2024-05-14 NOTE — TELEPHONE ENCOUNTER
[facility], gave her [place] number since Im here and that way we hopefully wont have to call each other back and forth.

## 2024-05-16 ENCOUNTER — TELEPHONE (OUTPATIENT)
Dept: INTERNAL MEDICINE | Facility: CLINIC | Age: 44
End: 2024-05-16
Payer: COMMERCIAL

## 2024-05-16 NOTE — TELEPHONE ENCOUNTER
Caller: Giana Barraza    Relationship: Self    Best call back number: [phone]     Who are you requesting to speak with (clinical staff, provider,  specific staff member): MOMO    What was the call regarding: PATIENT REQUESTS A CALL BACK TO DISCUSS CONCERNS OVER amitriptyline (ELAVIL) 10 MG tablet AND INTERACTION WITH GASTRO ISSUES

## 2024-05-21 ENCOUNTER — OFFICE VISIT (OUTPATIENT)
Dept: INTERNAL MEDICINE | Facility: CLINIC | Age: 44
End: 2024-05-21
Payer: COMMERCIAL

## 2024-05-21 VITALS
SYSTOLIC BLOOD PRESSURE: 126 MMHG | BODY MASS INDEX: 32.36 KG/M2 | TEMPERATURE: 97.8 F | HEART RATE: 104 BPM | DIASTOLIC BLOOD PRESSURE: 82 MMHG | HEIGHT: 65 IN | WEIGHT: 194.22 LBS | OXYGEN SATURATION: 97 %

## 2024-05-21 DIAGNOSIS — G43.E01 CHRONIC MIGRAINE WITH AURA AND WITH STATUS MIGRAINOSUS, NOT INTRACTABLE: ICD-10-CM

## 2024-05-21 PROCEDURE — 99213 OFFICE O/P EST LOW 20 MIN: CPT | Performed by: FAMILY MEDICINE

## 2024-05-21 RX ORDER — AMITRIPTYLINE HYDROCHLORIDE 10 MG/1
TABLET, FILM COATED ORAL
Qty: 60 TABLET | Refills: 1 | Status: SHIPPED | OUTPATIENT
Start: 2024-05-21

## 2024-05-21 NOTE — PROGRESS NOTES
Date of Encounter: 2024  Patient: Giana Barraza,  1980    Subjective   History of Presenting Illness  Chief complaint: Follow-up migraines    Patient presents for follow-up migraines.  She reports that they have remained constant with photosensitivity, fatigue, eye discomfort, but feels that they have been much less severe than before the amitriptyline.  She has noticed a little bit of worsening heartburn and change in bowels with amitriptyline but hard to tell whether that is premenstrual or medication related.  She did take a butalbital with only modest relief to her headache.  She has not scheduled her MRI.  No one else in the house has similar headaches.  She drinks coffee in the morning and does not drink additional caffeine during the day.  She is not taking magnesium or co-Q10 yet.    The following portions of the patient's history were reviewed and updated as appropriate: allergies, current medications, past family history, past medical history, past social history, past surgical history and problem list.    Patient Active Problem List   Diagnosis    Fibromyalgia    Lipomyelomeningocele    Congenital sacral meningocele    Palpitations    Heartburn    Seasonal allergic rhinitis    Menstrual migraine without status migrainosus, not intractable    Class 2 obesity due to excess calories without serious comorbidity with body mass index (BMI) of 35.0 to 35.9 in adult    Esophageal dysphagia    Family history of colon cancer     Past Medical History:   Diagnosis Date    Chest wall discomfort 2020    Fibromyalgia, primary     GERD (gastroesophageal reflux disease) Aug 2021    Headache     Palpitation 2020     Past Surgical History:   Procedure Laterality Date    COLONOSCOPY N/A 2024    Procedure: COLONOSCOPY;  Surgeon: Avery Francois MD;  Location: [facility];  Service: Gastroenterology;  Laterality: N/A;  Diverticulosis    DILATATION AND CURETTAGE      ENDOSCOPY N/A  4/29/2024    Procedure: ESOPHAGOGASTRODUODENOSCOPY WITH BIOPSY;  Surgeon: Avery Francois MD;  Location: [facility];  Service: Gastroenterology;  Laterality: N/A;  Erosive esophagitis; Biopsies- gastric, distal esophagus    HERNIA REPAIR      Months after birth     Family History   Problem Relation Age of Onset    Colon cancer Mother     No Known Problems Father     Heart disease Paternal Grandfather        Current Outpatient Medications:     amitriptyline (ELAVIL) 10 MG tablet, Take 2 tablet nightly for migraine prevention, Disp: 60 tablet, Rfl: 1    BIOTIN PO, Take  by mouth., Disp: , Rfl:     butalbital-acetaminophen-caffeine (Esgic) -40 MG per tablet, Take 1 tablet by mouth Every 6 (Six) Hours As Needed for Headache., Disp: 30 tablet, Rfl: 0    cetirizine (zyrTEC) 10 MG tablet, Take 1 tablet by mouth Daily., Disp: , Rfl:     cyclobenzaprine (FLEXERIL) 5 MG tablet, TAKE 1 TABLET BY MOUTH AT NIGHT AS NEEDED FOR MUSCLE SPASMS., Disp: 90 tablet, Rfl: 3    melatonin 1 MG tablet, Take  by mouth., Disp: , Rfl:     multivitamin with minerals tablet tablet, Take 1 tablet by mouth Daily., Disp: , Rfl:     Kelsey 3-0.02 MG per tablet, TAKE 1 TABLET BY MOUTH EVERY DAY, Disp: 84 tablet, Rfl: 3    ondansetron ODT (ZOFRAN-ODT) 4 MG disintegrating tablet, Take 1 tablet by mouth Every 8 (Eight) Hours As Needed for Nausea or Vomiting., Disp: 30 tablet, Rfl: 0    pantoprazole (PROTONIX) 40 MG EC tablet, Take 1 tablet by mouth 2 (Two) Times a Day Before Meals., Disp: 180 tablet, Rfl: 3    PROBIOTIC PRODUCT PO, qhs, Disp: , Rfl:     SUMAtriptan (Imitrex) 50 MG tablet, Take one tablet at onset of headache. May repeat dose one time in 2 hours if headache not relieved., Disp: 12 tablet, Rfl: 3    VITAMIN D PO, Vitamin D  once daily, Disp: , Rfl:   No Known Allergies  Social History     Tobacco Use    Smoking status: Never    Smokeless tobacco: Never   Vaping Use    Vaping status: Never Used   Substance Use Topics     "Alcohol use: Not Currently    Drug use: Never          Objective   Physical Exam  Vitals:    05/21/24 0855   BP: 126/82   BP Location: Left arm   Patient Position: Sitting   Cuff Size: Large Adult   Pulse: 104   Temp: 97.8 °F (36.6 °C)   TempSrc: Infrared   SpO2: 97%   Weight: 88.1 kg (194 lb 3.6 oz)   Height: 165.1 cm (65\")     Body mass index is 32.32 kg/m².    Constitutional: NAD.  Psychiatric: Normal affect. Normal thought content.     Assessment & Plan   Assessment and Plan  Pleasant 43-year-old female with fibromyalgia, GERD, migraines, asymptomatic congenital sacral meningocele, allergic rhinitis, who presents with the following:     Diagnoses and all orders for this visit:    1. Chronic migraine with aura and with status migrainosus, not intractable  -     amitriptyline (ELAVIL) 10 MG tablet; Take 2 tablet nightly for migraine prevention  Dispense: 60 tablet; Refill: 1    Recommend increasing the amitriptyline to 2 tabs nightly.  Also recommend purchasing magnesium oxide 400 mg and coenzyme Q 10 supplements over-the-counter and taking at night as well.  Continue butalbital for more severe headaches, but if intractable I asked her to call and I am willing to call in some hydrocodone for more severe symptoms until we are able to get these arranged in.  I am going to refer her to neurology as a backup plan since we are not getting great relief yet.    Carter Ellis MD  Family Medicine  O: [phone]    Disclaimer: Parts of this note were dictated by speech recognition. Minor errors in transcription may be present. Please call if questions.     "

## 2024-05-29 DIAGNOSIS — G43.E01 CHRONIC MIGRAINE WITH AURA AND WITH STATUS MIGRAINOSUS, NOT INTRACTABLE: ICD-10-CM

## 2024-05-29 RX ORDER — AMITRIPTYLINE HYDROCHLORIDE 10 MG/1
TABLET, FILM COATED ORAL
Qty: 60 TABLET | Refills: 1 | Status: SHIPPED | OUTPATIENT
Start: 2024-05-29

## 2024-05-29 NOTE — TELEPHONE ENCOUNTER
Caller: Rochelle Barrazanifer    Relationship: Self    Best call back number: [phone]     Requested Prescriptions:   Requested Prescriptions     Pending Prescriptions Disp Refills    amitriptyline (ELAVIL) 10 MG tablet 60 tablet 1     Sig: Take 2 tablet nightly for migraine prevention        Pharmacy where request should be sent: [id number] - [place], [place] - [address] [phone] [facility] [phone]      Last office visit with prescribing clinician: 5/21/2024   Last telemedicine visit with prescribing clinician: Visit date not found   Next office visit with prescribing clinician: 8/13/2024     Additional details provided by patient:   NEED NEW PRESCRIPTION TO 25 MG     Does the patient have less than a 3 day supply:  [] Yes  [] No    Would you like a call back once the refill request has been completed: [] Yes [] No    If the office needs to give you a call back, can they leave a voicemail: [] Yes [] No    Anastacio Mancilla   05/29/24 13:05 EDT         DELETE AFTER READING TO PATIENT: “Thank you for sharing this information with me. I will send a message to the clinical team. Please allow 48 hours for the clinical staff to follow up on this request.”

## 2024-05-31 ENCOUNTER — TELEPHONE (OUTPATIENT)
Dept: INTERNAL MEDICINE | Facility: CLINIC | Age: 44
End: 2024-05-31
Payer: COMMERCIAL

## 2024-05-31 NOTE — TELEPHONE ENCOUNTER
Caller: Giana Barraza    Relationship: Self    Best call back number: [phone]       What was the call regarding: PATIENT STATED SHE HAS BEEN TAKING THE     amitriptyline (ELAVIL) 20 MG tablet   SINCE LAST TUESDAY. YESTERDAY, PATIENT HAD A MIGRAINE THAT MADE HER NAUSEOUS, LIGHT HEADED AND LIGHT SENSITIVE. PATIENT TOOK     butalbital-acetaminophen-caffeine (Esgic) -40 MG per tablet     WHICH MADE THE PAIN MORE MANAGEABLE FOR ABOUT 3 HOURS BEFORE IT WORE OFF    PATIENT IS REQUESTING TO KNOW IF [facility] WANTS HER TO CONTINUE WITH THE NEW MEDICATIONS OR DUE TO THE MIGRAINE PATIENT EXPERIENCED, IS THERE ANOTHER ROUTE PATIENT SHOULD GO    PLEASE ADVISE

## 2024-06-03 NOTE — TELEPHONE ENCOUNTER
OK to take the butalbital pill daily as needed for now until we get the MRI    I would continue amitriptyline

## 2024-06-03 NOTE — TELEPHONE ENCOUNTER
Name: Giana Barraza    Relationship: Self    Best Callback Number: [phone]     HUB PROVIDED THE RELAY MESSAGE FROM THE OFFICE   PATIENT HAS FURTHER QUESTIONS AND WOULD LIKE A CALL BACK AT THE FOLLOWING PHONE NUMBER [phone]    ADDITIONAL INFORMATION: PATIENT STATES THAT SHE HAS BEEN TAKING THE amitriptyline (ELAVIL) 10 MG tablet EVERY NIGHT, BUT STILL SEEMS TO HAVE SOME DEGREE OF HEADACHE EVERY DAY, BUT NOT ALWAYS AS SEVERE AS A MIGRAINE. SHE HAS BEEN TAKING butalbital-acetaminophen-caffeine (Esgic) -40 MG per tablet FOR ADDITIONAL RELIEF, AND HAS HAD TO TAKE IT FRIDAY FOR A PRETTY SEVERE HEADACHE. SHE HAS MRI SCHEDULED 6/5/24, AND MEETS WITH NEUROLOGY ON 6/24/24 FOR FURTHER EVALUATION. SHE WOULD LIKE TO KNOW IF IT IS OK TO TAKE THE butalbital-acetaminophen-caffeine (Esgic) -40 MG per tablet ONCE DAILY, SINCE SHE HAS BEEN NEEDING IT FREQUENTLY. PLEASE CALL TO FOLLOW UP

## 2024-06-03 NOTE — TELEPHONE ENCOUNTER
LMOM for pt to call office and let us know if the Amitriptyline is helping at all?  Are migraines happening any less frequently?    HUB CAN SHARE

## 2024-06-05 ENCOUNTER — TELEPHONE (OUTPATIENT)
Dept: INTERNAL MEDICINE | Facility: CLINIC | Age: 44
End: 2024-06-05

## 2024-06-05 ENCOUNTER — HOSPITAL ENCOUNTER (OUTPATIENT)
Dept: MRI IMAGING | Facility: HOSPITAL | Age: 44
Discharge: HOME | End: 2024-06-05
Payer: COMMERCIAL

## 2024-06-05 DIAGNOSIS — G43.E01 CHRONIC MIGRAINE WITH AURA AND WITH STATUS MIGRAINOSUS, NOT INTRACTABLE: ICD-10-CM

## 2024-06-05 DIAGNOSIS — F41.9 EPISODE OF ANXIETY: Primary | ICD-10-CM

## 2024-06-05 NOTE — TELEPHONE ENCOUNTER
.    Caller: Giana Barraza    Relationship: Self    Best call back number: [phone]     What medication are you requesting: A MEDICATION TO HELP WITH DOING THE MRI     If a prescription is needed, what is your preferred pharmacy and phone number: [id number] - [place], [place] - [address] [phone] [facility] [phone] FX     Additional notes: PATIENT STATES SHE WAS ONLY ABLE TO DO 2 MINUTES OF THE MRI. SHE STATES SHE WAS UNABLE TO FINISH AND WAS TOLD TO ASK HER CP FOR A MEDICATION TO HELP. SHE STATES SHE IS RESCHEDULED FOR 6/17/24 AND WOULD LIKE TO KNOW WHAT DR ROSARIO RECOMMENDS HER TO TAKE.

## 2024-06-06 RX ORDER — DIAZEPAM 2 MG/1
TABLET ORAL
Qty: 4 TABLET | Refills: 0 | Status: SHIPPED | OUTPATIENT
Start: 2024-06-06 | End: 2024-06-24

## 2024-06-06 NOTE — TELEPHONE ENCOUNTER
Will send in valium to take before MRI  She would need a ride due to sedating quality of med  Gave her 4 tabs so she can try during an evening before scan to see if she tolerates well

## 2024-06-07 NOTE — TELEPHONE ENCOUNTER
"Relay       LMOM  \"  Will send in valium to take before MRI  She would need a ride due to sedating quality of med  Gave her 4 tabs so she can try during an evening before scan to see if she tolerates well   \"                "

## 2024-06-13 ENCOUNTER — OFFICE VISIT (OUTPATIENT)
Dept: GASTROENTEROLOGY | Facility: CLINIC | Age: 44
End: 2024-06-13
Payer: COMMERCIAL

## 2024-06-13 VITALS
DIASTOLIC BLOOD PRESSURE: 90 MMHG | SYSTOLIC BLOOD PRESSURE: 136 MMHG | WEIGHT: 193 LBS | HEIGHT: 65 IN | BODY MASS INDEX: 32.15 KG/M2

## 2024-06-13 DIAGNOSIS — K21.00 GASTROESOPHAGEAL REFLUX DISEASE WITH ESOPHAGITIS, UNSPECIFIED WHETHER HEMORRHAGE: Primary | ICD-10-CM

## 2024-06-13 DIAGNOSIS — Z80.0 FAMILY HISTORY OF COLON CANCER: ICD-10-CM

## 2024-06-13 RX ORDER — VONOPRAZAN FUMARATE 26.72 MG/1
20 TABLET ORAL DAILY
Qty: 30 TABLET | Refills: 1 | COMMUNITY
Start: 2024-06-13 | End: 2024-06-13 | Stop reason: SDUPTHER

## 2024-06-13 RX ORDER — VONOPRAZAN FUMARATE 26.72 MG/1
20 TABLET ORAL DAILY
Qty: 30 TABLET | Refills: 1 | Status: SHIPPED | OUTPATIENT
Start: 2024-06-13

## 2024-06-13 NOTE — PROGRESS NOTES
PATIENT INFORMATION  Giana Barraza       - 1980    CHIEF COMPLAINT  Chief Complaint   Patient presents with    Difficulty Swallowing    Heartburn    Gastritis        HISTORY OF PRESENT ILLNESS    Here today for EGD and Colon follow-up    2024 EGD and Colon for dysphagia positive for mild chronic gastritis, reflux esophagitis, negative for HP or Barretts. Colon with no polyps. Family hx CRC.    Migraines have been almost daily since LOV. Avoiding NSAIDs which is the only thing that has been helpful. Up to 20 mg elavil now and seems to be helping and recently has tried Esgic. HA have been more bearable.     Per LOV: In February saw PCP with slow onset of dysphagia. But for as long as she can remember dense foods feel like they stick and gags until gets water. Vitamin at night started sticking. Worse in evening does not want to swallow because feels like will back up, can drink and not coughing up. Cannot burp, but feels a lot of pressure. Was using prilosec around cycle when HB flared, then started daily pantoprazole and helped, but did not completely alleviate. Severe epigastric pain occasionally, has not happened since February. Difficulty laying down, does not eat a lot at once. NSAIDs, was taking aleve nightly for a long time, now PRN.    TODAY: Was doing better with some breakthrough, after MRI episode causing severe anxiety having. Swallowing was improving, but now more conscious when eating. Can calm herself better now that has had a scope. Is on BID PPI.     Diagnosed with fibromyalgia.  2024 normal abd US     Bowels have slowed a little, hard to loose, bowels 6 of 7 days, not usually hard. Not taking fiber supplement. Was more complete with metamucil so will resume.       Difficulty Swallowing  Associated symptoms include headaches and nausea. Pertinent negatives include no abdominal pain or vomiting.   Heartburn  She complains of nausea. She reports no abdominal pain.       REVIEWED  PERTINENT RESULTS/ LABS  Lab Results   Component Value Date    CASEREPORT  04/29/2024     Surgical Pathology Report                         Case: [id number]                                  Authorizing Provider:  Avery Francois        Collected:           04/29/2024 10:54 AM                                 MD Marie                                                                   Ordering Location:     [facility]   Received:            04/29/2024 12:51 PM                                 OR                                                                           Pathologist:           Sarah Jean MD                                                          Specimens:   1) - Gastric, Antrum, Gastric biopsies                                                              2) - Esophagus, Distal, Biopsies                                                           FINALDX  04/29/2024     1. Stomach, antrum, biopsy:   A. Gastric antral mucosa with mild chronic inactive gastritis.   B. Negative for intestinal metaplasia.   C. Negative for H. pylori-type organisms on routine stain.    2. Esophagus, distal, biopsy:   A. Squamocolumnar mucosa with spongiosis, increased intraepithelial lymphocytes, and eosinophils        (up to 1/HPF), consistent with reflux esophagitis.   B. Negative for intestinal metaplasia.          Lab Results   Component Value Date    HGB 14.0 05/05/2024    MCV 88.0 05/05/2024     05/05/2024    ALT 35 (H) 04/01/2024    AST 32 04/01/2024    HGBA1C 5.3 08/07/2023    INR 1.0 10/19/2023    TRIG 153 (H) 08/07/2023      No results found.    REVIEW OF SYSTEMS  Review of Systems   Constitutional: Negative.    HENT:  Positive for trouble swallowing.    Eyes: Negative.    Respiratory: Negative.     Cardiovascular: Negative.    Gastrointestinal:  Positive for nausea. Negative for abdominal pain, constipation, diarrhea and vomiting.        Fam Hx CRC, GERD, Gastritis   Endocrine:  Negative.    Genitourinary: Negative.    Musculoskeletal: Negative.    Skin: Negative.    Allergic/Immunologic: Negative.    Neurological:  Positive for headaches.   Hematological: Negative.    Psychiatric/Behavioral:  The patient is nervous/anxious.          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]          PAST MEDICAL HISTORY  Past Medical History:   Diagnosis Date    Chest wall discomfort 12/03/2020    Fibromyalgia, primary     GERD (gastroesophageal reflux disease) Aug 2021    Headache     Palpitation 12/03/2020         SURGICAL HISTORY  Past Surgical History:   Procedure Laterality Date    COLONOSCOPY N/A 4/29/2024    Procedure: COLONOSCOPY;  Surgeon: Avery Francois MD;  Location: [facility];  Service: Gastroenterology;  Laterality: N/A;  Diverticulosis    DILATATION AND CURETTAGE      ENDOSCOPY N/A 4/29/2024    Procedure: ESOPHAGOGASTRODUODENOSCOPY WITH BIOPSY;  Surgeon: Avery Francois MD;  Location: [facility];  Service: Gastroenterology;  Laterality: N/A;  Erosive esophagitis; Biopsies- gastric, distal esophagus    HERNIA REPAIR      Months after birth         FAMILY HISTORY  Family History   Problem Relation Age of Onset    Colon cancer Mother     No Known Problems Father     Heart disease Paternal Grandfather          SOCIAL HISTORY  Social History     Occupational History    Not on file   Tobacco Use    Smoking status: Never    Smokeless tobacco: Never   Vaping Use    Vaping status: Never Used  "  Substance and Sexual Activity    Alcohol use: Not Currently    Drug use: Never    Sexual activity: Defer         CURRENT MEDICATIONS    Current Outpatient Medications:     amitriptyline (ELAVIL) 10 MG tablet, Take 2 tablet nightly for migraine prevention, Disp: 60 tablet, Rfl: 1    BIOTIN PO, Take  by mouth., Disp: , Rfl:     butalbital-acetaminophen-caffeine (Esgic) -40 MG per tablet, Take 1 tablet by mouth Every 6 (Six) Hours As Needed for Headache., Disp: 30 tablet, Rfl: 0    cetirizine (zyrTEC) 10 MG tablet, Take 1 tablet by mouth Daily., Disp: , Rfl:     cyclobenzaprine (FLEXERIL) 5 MG tablet, TAKE 1 TABLET BY MOUTH AT NIGHT AS NEEDED FOR MUSCLE SPASMS., Disp: 90 tablet, Rfl: 3    melatonin 1 MG tablet, Take  by mouth., Disp: , Rfl:     multivitamin with minerals tablet tablet, Take 1 tablet by mouth Daily., Disp: , Rfl:     Kelsey 3-0.02 MG per tablet, TAKE 1 TABLET BY MOUTH EVERY DAY, Disp: 84 tablet, Rfl: 3    ondansetron ODT (ZOFRAN-ODT) 4 MG disintegrating tablet, Take 1 tablet by mouth Every 8 (Eight) Hours As Needed for Nausea or Vomiting., Disp: 30 tablet, Rfl: 0    PROBIOTIC PRODUCT PO, qhs, Disp: , Rfl:     SUMAtriptan (Imitrex) 50 MG tablet, Take one tablet at onset of headache. May repeat dose one time in 2 hours if headache not relieved., Disp: 12 tablet, Rfl: 3    VITAMIN D PO, Vitamin D  once daily, Disp: , Rfl:     diazePAM (Valium) 2 MG tablet, Take 1/2 to 1 tablet by mouth 1 hour before needed. May take an additional tablet if not effective. (Patient not taking: Reported on 6/13/2024), Disp: 4 tablet, Rfl: 0    Vonoprazan Fumarate (Voquezna) 20 MG tablet, Take 1 tablet by mouth Daily., Disp: 30 tablet, Rfl: 1    ALLERGIES  Patient has no known allergies.    VITALS  Vitals:    06/13/24 1420   BP: 136/90   BP Location: Left arm   Patient Position: Sitting   Cuff Size: Large Adult   Weight: 87.5 kg (193 lb)   Height: 165.1 cm (65\")       PHYSICAL EXAM  Debilities/Disabilities Identified: " "None  Emotional Behavior: Appropriate  Wt Readings from Last 3 Encounters:   06/13/24 87.5 kg (193 lb)   05/21/24 88.1 kg (194 lb 3.6 oz)   06/05/24 88 kg (194 lb)     Ht Readings from Last 1 Encounters:   06/13/24 165.1 cm (65\")     Body mass index is 32.12 kg/m².  Physical Exam  Constitutional:       General: She is not in acute distress.     Appearance: Normal appearance. She is not ill-appearing.   HENT:      Head: Normocephalic and atraumatic.      Mouth/Throat:      Mouth: Mucous membranes are moist.      Pharynx: No posterior oropharyngeal erythema.   Eyes:      General: No scleral icterus.  Cardiovascular:      Rate and Rhythm: Normal rate and regular rhythm.      Heart sounds: Normal heart sounds.   Pulmonary:      Effort: Pulmonary effort is normal.      Breath sounds: Normal breath sounds.   Abdominal:      General: Abdomen is flat. Bowel sounds are normal. There is no distension.      Palpations: Abdomen is soft. There is no mass.      Tenderness: There is no abdominal tenderness. There is no guarding or rebound. Negative signs include Rodgers's sign.      Hernia: No hernia is present.   Musculoskeletal:      Cervical back: Neck supple.   Skin:     General: Skin is warm.      Capillary Refill: Capillary refill takes less than 2 seconds.   Neurological:      General: No focal deficit present.      Mental Status: She is alert and oriented to person, place, and time.   Psychiatric:         Mood and Affect: Mood normal.         Behavior: Behavior normal.         Thought Content: Thought content normal.         Judgment: Judgment normal.         CLINICAL DATA REVIEWED   reviewed previous lab results and integrated with today's visit, reviewed notes from other physicians and/or last GI encounter, reviewed previous endoscopy results and available photos, reviewed surgical pathology results from previous biopsies    ASSESSMENT  Diagnoses and all orders for this visit:    Gastroesophageal reflux disease with " esophagitis, unspecified whether hemorrhage    Family history of colon cancer    Other orders  -     Vonoprazan Fumarate (Voquezna) 20 MG tablet; Take 1 tablet by mouth Daily.          PLAN    Hold PPI, start voquezna    Return in about 3 months (around 9/13/2024).    I have discussed the above plan with the patient.  They verbalize understanding and are in agreement with the plan.  They have been advised to contact the office for any questions, concerns, or changes related to their health.

## 2024-06-17 ENCOUNTER — HOSPITAL ENCOUNTER (OUTPATIENT)
Dept: MRI IMAGING | Facility: HOSPITAL | Age: 44
Discharge: HOME | End: 2024-06-17
Admitting: FAMILY MEDICINE
Payer: COMMERCIAL

## 2024-06-17 PROCEDURE — 70553 MRI BRAIN STEM W/O & W/DYE: CPT

## 2024-06-17 PROCEDURE — 0 GADOBENATE DIMEGLUMINE 529 MG/ML SOLUTION: Performed by: FAMILY MEDICINE

## 2024-06-17 PROCEDURE — A9577 INJ MULTIHANCE: HCPCS | Performed by: FAMILY MEDICINE

## 2024-06-17 RX ADMIN — GADOBENATE DIMEGLUMINE 17 ML: 529 INJECTION, SOLUTION INTRAVENOUS at 11:06

## 2024-06-18 ENCOUNTER — TELEPHONE (OUTPATIENT)
Dept: INTERNAL MEDICINE | Facility: CLINIC | Age: 44
End: 2024-06-18

## 2024-06-18 NOTE — TELEPHONE ENCOUNTER
Caller: Giana Barraza    Relationship: Self    Best call back number:     What was the call regarding: RETURNING CALL TO DR. ROSARIO

## 2024-06-24 ENCOUNTER — OFFICE VISIT (OUTPATIENT)
Dept: NEUROLOGY | Facility: CLINIC | Age: 44
End: 2024-06-24
Payer: COMMERCIAL

## 2024-06-24 VITALS
SYSTOLIC BLOOD PRESSURE: 132 MMHG | HEIGHT: 65 IN | DIASTOLIC BLOOD PRESSURE: 80 MMHG | BODY MASS INDEX: 32.15 KG/M2 | WEIGHT: 193 LBS | HEART RATE: 72 BPM

## 2024-06-24 DIAGNOSIS — M54.81 BILATERAL OCCIPITAL NEURALGIA: ICD-10-CM

## 2024-06-24 DIAGNOSIS — G43.009 MIGRAINE WITHOUT AURA AND WITHOUT STATUS MIGRAINOSUS, NOT INTRACTABLE: Primary | ICD-10-CM

## 2024-06-24 PROBLEM — G43.829 MENSTRUAL MIGRAINE WITHOUT STATUS MIGRAINOSUS, NOT INTRACTABLE: Status: RESOLVED | Noted: 2023-08-07 | Resolved: 2024-06-24

## 2024-06-24 PROCEDURE — 99204 OFFICE O/P NEW MOD 45 MIN: CPT | Performed by: PSYCHIATRY & NEUROLOGY

## 2024-06-24 RX ORDER — AMITRIPTYLINE HYDROCHLORIDE 25 MG/1
25 TABLET, FILM COATED ORAL NIGHTLY
Qty: 30 TABLET | Refills: 2 | Status: SHIPPED | OUTPATIENT
Start: 2024-06-24

## 2024-06-24 RX ORDER — ACETAMINOPHEN 325 MG/1
650 TABLET ORAL EVERY 6 HOURS PRN
COMMUNITY

## 2024-06-24 RX ORDER — PROPRANOLOL HYDROCHLORIDE 20 MG/1
20 TABLET ORAL 2 TIMES DAILY
Qty: 60 TABLET | Refills: 2 | Status: SHIPPED | OUTPATIENT
Start: 2024-06-24

## 2024-06-24 RX ORDER — NARATRIPTAN 2.5 MG/1
2.5 TABLET ORAL AS NEEDED
Qty: 9 TABLET | Refills: 2 | Status: SHIPPED | OUTPATIENT
Start: 2024-06-24

## 2024-06-24 NOTE — PROGRESS NOTES
Chief Complaint  Migraine (Menstral migraines- last few months head pain started daily - late march - sumatriptan no help- on amitriplyine 20mg for prevention- )    Subjective          Giana Barraza presents to [facility] for   HISTORY OF PRESENT ILLNESS:    Giana Barraza is a 43 year old right handed woman who presents to neurology clinic for initial evaluation and treatment of migraines.  She reports a history of migraines starting in her late 20's.  It seemed to improve with changing her birth control until over the past 3-4 years since moving to [place], [place].  She tells me she has been diagnosed with fibromyalgia around 5 years ago after having her daughter.  She has headaches involving the back of her head and also her temples and front of her head which have increased since 3/2024 and has been more consistent.  The pain is typically dull and achy which she rates as 8/10 on pain scale 1-10 when most severe with associated light and sound sensitivity along with nausea without vomiting.  They can last up to 2-3 days in duration.  She is waking up with some discomfort every day.  She is currently getting 2-3 headache days per week.  She had a brain MRI scan on 6/17/2024 which I reviewed the images independently on her visit today and does not demonstrate any acute intracranial abnormalities with minimal white matter changes which I reviewed with her today.  She denies any family history of migraines.  She does report having menstrual related migraines.  She has noticed that barometric pressure changes can also effect her as well.  She is currently on amitriptyline 20 mg at bedtime along with magnesium and Co-Q10 and melatonin.  She has tried sumatriptan which was not helpful.  She has tried Flexeril in the past.  She was also given Nurtec ODT and Ubrelvy samples which dulled her headaches a little but no significant changes.  She thinks that Aleve helps and she has tried Esgic  "which is ok but not great.  She does think Tylenol also helps at times.  She does report having significant tension in her muscles in her neck and shoulders.  She does report having some anxiety.      Past Medical History:   Diagnosis Date    Bilateral occipital neuralgia 6/24/2024    Chest wall discomfort 12/03/2020    Fibromyalgia, primary     GERD (gastroesophageal reflux disease) Aug 2021    Headache     Headache, tension-type Elijah    Migraine Elijah    Palpitation 12/03/2020        Family History   Problem Relation Age of Onset    Colon cancer Mother     No Known Problems Father     Heart disease Paternal Grandfather         Social History     Socioeconomic History    Marital status:    Tobacco Use    Smoking status: Never    Smokeless tobacco: Never   Vaping Use    Vaping status: Never Used   Substance and Sexual Activity    Alcohol use: Not Currently    Drug use: Never    Sexual activity: Defer        I have reviewed and confirmed the accuracy of the ROS as documented by the MA/LPN/RN Enrique Ng MD   Review of Systems   Constitutional:  Positive for appetite change. Negative for activity change.   HENT:  Negative for trouble swallowing and voice change.    Eyes:  Positive for pain. Negative for blurred vision, double vision and discharge.   Gastrointestinal:  Positive for nausea and vomiting.   Neurological:  Positive for headache. Negative for dizziness, tremors, seizures, syncope, facial asymmetry, speech difficulty, weakness, light-headedness, numbness, memory problem and confusion.   Psychiatric/Behavioral:  Positive for sleep disturbance. Negative for agitation, behavioral problems, decreased concentration, dysphoric mood, hallucinations, self-injury, suicidal ideas, negative for hyperactivity, depressed mood and stress. The patient is nervous/anxious.         Objective   Vital Signs:   /80   Pulse 72   Ht 165.1 cm (65\")   Wt 87.5 kg (193 lb)   BMI 32.12 kg/m²       PHYSICAL " EXAM:    General   Mental Status - Alert. General Appearance - Well developed, Well groomed, Oriented and Cooperative. Orientation - Oriented X3.       Head and Neck  Head - normocephalic, atraumatic with no lesions or palpable masses. Tenderness to palpation over the bilateral greater and lesser occipital nerve distribution.  Neck    Global Assessment - supple.       Eye   Sclera/Conjunctiva - Bilateral - Normal.    ENMT  Mouth and Throat   Oral Cavity/Oropharynx: Oropharynx - the soft palate,uvula and tongue are normal in appearance.    Chest and Lung Exam   Chest - lung clear to auscultation bilaterally.    Cardiovascular   Cardiovascular examination reveals  - normal heart sounds, regular rate and rhythm.    Neurologic   Mental Status: Speech - Normal. Cognitive function - appropriate fund of knowledge. No impairment of attention, Impairment of concentration, impairment of long term memory or impairment of short term memory.  Cranial Nerves:   II Optic: Visual acuity - Left - Normal. Right - Normal. Visual fields - Normal (to confrontation).  III Oculomotor: Pupillary constriction - Left - Normal. Right - Normal.  VII Facial: - Normal Bilaterally.   IX Glossopharyngeal / X Vagus - Normal.  XI Accessory: Trapezius - Bilateral - Normal. Sternocleidomastoid - Bilateral - Normal.  XII Hypoglossal - Bilateral - Normal.  Eye Movements: - Normal Bilaterally.  Sensory:   Light Touch: Intact - Globally.  Motor:   Bulk and Contour: - Normal.  Tone: - Normal.  Tremor: Not present.  Strength: 5/5 normal muscle strength - All Muscles.   General Assessment of Reflexes: - deep tendon reflexes are normal. Coordination - No Impairment of finger-to-nose or Impairment of rapid alternating movements. Gait - Normal.       Result Review :                 Assessment and Plan    Problem List Items Addressed This Visit          Musculoskeletal and Injuries    Bilateral occipital neuralgia    Current Assessment & Plan     Tenderness to  palpation over the bilateral greater and lesser occipital nerve distribution.  Will set her up for bilateral GREATER and LESSER ONBs.           Relevant Medications    acetaminophen (TYLENOL) 325 MG tablet    amitriptyline (ELAVIL) 25 MG tablet    naratriptan (AMERGE) 2.5 MG tablet    propranolol (INDERAL) 20 MG tablet       Neuro    Migraine without aura and without status migrainosus, not intractable - Primary    Current Assessment & Plan     43 year old right handed woman with refractory migraines without aura.  She reports a history of migraines starting in her late 20's.  It seemed to improve with changing her birth control until over the past 3-4 years since moving to [place], [place].  She tells me she has been diagnosed with fibromyalgia around 5 years ago after having her daughter.  She has headaches involving the back of her head and also her temples and front of her head which have increased since 3/2024 and has been more consistent.  The pain is typically dull and achy which she rates as 8/10 on pain scale 1-10 when most severe with associated light and sound sensitivity along with nausea without vomiting.  They can last up to 2-3 days in duration.  She is waking up with some discomfort every day.  She is currently getting 2-3 headache days per week.  She had a brain MRI scan on 6/17/2024 which I reviewed the images independently on her visit today and does not demonstrate any acute intracranial abnormalities with minimal white matter changes which I reviewed with her today.  She denies any family history of migraines.  She does report having menstrual related migraines.  She has noticed that barometric pressure changes can also effect her as well.  She is currently on amitriptyline 20 mg at bedtime along with magnesium and Co-Q10 and melatonin.  She has tried sumatriptan which was not helpful.  She has tried Flexeril in the past.  She was also given Nurtec ODT and Ubrelvy samples which dulled her  headaches a little but no significant changes.  She thinks that Aleve helps and she has tried Esgic which is ok but not great.  She does think Tylenol also helps at times.  She does report having significant tension in her muscles in her neck and shoulders.  She does report having some anxiety.  I will increase her dose of the amitriptyline which does appear to be helping, I will also start her on propranolol which can help with migraine prevention along with anxiety treatment.  I will also start her on naratriptan for acute treatment of her migraines.  Advised her not to get pregnant on these medicines.  Discussed migraine triggers and lifestyle modifications and provided patient education information today.           Relevant Medications    acetaminophen (TYLENOL) 325 MG tablet    amitriptyline (ELAVIL) 25 MG tablet    naratriptan (AMERGE) 2.5 MG tablet    propranolol (INDERAL) 20 MG tablet     I spent 50 minutes caring for Giana on this date of service. This time includes time spent by me in the following activities:preparing for the visit, reviewing tests, obtaining and/or reviewing a separately obtained history, performing a medically appropriate examination and/or evaluation , counseling and educating the patient/family/caregiver, ordering medications, tests, or procedures, documenting information in the medical record, independently interpreting results and communicating that information with the patient/family/caregiver, and care coordination    Follow Up   Return in about 2 months (around 8/24/2024).  Patient was given instructions and counseling regarding her condition or for health maintenance advice. Please see specific information pulled into the AVS if appropriate.

## 2024-06-24 NOTE — ASSESSMENT & PLAN NOTE
43 year old right handed woman with refractory migraines without aura.  She reports a history of migraines starting in her late 20's.  It seemed to improve with changing her birth control until over the past 3-4 years since moving to [place], [place].  She tells me she has been diagnosed with fibromyalgia around 5 years ago after having her daughter.  She has headaches involving the back of her head and also her temples and front of her head which have increased since 3/2024 and has been more consistent.  The pain is typically dull and achy which she rates as 8/10 on pain scale 1-10 when most severe with associated light and sound sensitivity along with nausea without vomiting.  They can last up to 2-3 days in duration.  She is waking up with some discomfort every day.  She is currently getting 2-3 headache days per week.  She had a brain MRI scan on 6/17/2024 which I reviewed the images independently on her visit today and does not demonstrate any acute intracranial abnormalities with minimal white matter changes which I reviewed with her today.  She denies any family history of migraines.  She does report having menstrual related migraines.  She has noticed that barometric pressure changes can also effect her as well.  She is currently on amitriptyline 20 mg at bedtime along with magnesium and Co-Q10 and melatonin.  She has tried sumatriptan which was not helpful.  She has tried Flexeril in the past.  She was also given Nurtec ODT and Ubrelvy samples which dulled her headaches a little but no significant changes.  She thinks that Aleve helps and she has tried Esgic which is ok but not great.  She does think Tylenol also helps at times.  She does report having significant tension in her muscles in her neck and shoulders.  She does report having some anxiety.  I will increase her dose of the amitriptyline which does appear to be helping, I will also start her on propranolol which can help with migraine prevention  along with anxiety treatment.  I will also start her on naratriptan for acute treatment of her migraines.  Advised her not to get pregnant on these medicines.  Discussed migraine triggers and lifestyle modifications and provided patient education information today.

## 2024-06-24 NOTE — ASSESSMENT & PLAN NOTE
Tenderness to palpation over the bilateral greater and lesser occipital nerve distribution.  Will set her up for bilateral GREATER and LESSER ONBs.

## 2024-06-25 ENCOUNTER — PATIENT ROUNDING (BHMG ONLY) (OUTPATIENT)
Dept: NEUROLOGY | Facility: CLINIC | Age: 44
End: 2024-06-25
Payer: COMMERCIAL

## 2024-06-27 ENCOUNTER — PROCEDURE VISIT (OUTPATIENT)
Dept: NEUROLOGY | Facility: CLINIC | Age: 44
End: 2024-06-27
Payer: COMMERCIAL

## 2024-06-27 DIAGNOSIS — M54.81 BILATERAL OCCIPITAL NEURALGIA: Primary | ICD-10-CM

## 2024-06-27 RX ORDER — LIDOCAINE HYDROCHLORIDE 20 MG/ML
5 INJECTION, SOLUTION EPIDURAL; INFILTRATION; INTRACAUDAL; PERINEURAL ONCE
Status: COMPLETED | OUTPATIENT
Start: 2024-06-27 | End: 2024-06-27

## 2024-06-27 RX ORDER — METHYLPREDNISOLONE ACETATE 40 MG/ML
40 INJECTION, SUSPENSION INTRA-ARTICULAR; INTRALESIONAL; INTRAMUSCULAR; SOFT TISSUE ONCE
Status: COMPLETED | OUTPATIENT
Start: 2024-06-27 | End: 2024-06-27

## 2024-06-27 RX ADMIN — LIDOCAINE HYDROCHLORIDE 5 ML: 20 INJECTION, SOLUTION EPIDURAL; INFILTRATION; INTRACAUDAL; PERINEURAL at 10:20

## 2024-06-27 RX ADMIN — METHYLPREDNISOLONE ACETATE 40 MG: 40 INJECTION, SUSPENSION INTRA-ARTICULAR; INTRALESIONAL; INTRAMUSCULAR; SOFT TISSUE at 10:23

## 2024-06-27 RX ADMIN — METHYLPREDNISOLONE ACETATE 40 MG: 40 INJECTION, SUSPENSION INTRA-ARTICULAR; INTRALESIONAL; INTRAMUSCULAR; SOFT TISSUE at 10:21

## 2024-06-27 NOTE — PROGRESS NOTES
Occipital Nerve Block Procedure Note:    PROCEDURE IN DETAIL:  The patient was injected in the bilateral GREATER and LESSER occipital nerves with 2% lidocaine, a total of 2.5 mL times 4, and Depomedrol total of 0.5 mL or 20 mg times 4 after obtaining written consent. Sterile technique was used including sterile gloves and needles. Injection sites identified using known anatomical landmarks.  The area to be injected was prepped with sterilizing agent. The patient tolerated the procedure without any complications but following her injections she felt light headed and sweaty seemed to have had some vasovagal symptoms associated with injections which improved with laying down and drinking cold water. She will be returning for injection as indicated and clinic for follow up as previously scheduled.      Greater and lesser

## 2024-07-01 DIAGNOSIS — G43.E01 CHRONIC MIGRAINE WITH AURA AND WITH STATUS MIGRAINOSUS, NOT INTRACTABLE: ICD-10-CM

## 2024-07-01 DIAGNOSIS — F41.9 EPISODE OF ANXIETY: ICD-10-CM

## 2024-07-01 RX ORDER — BUTALBITAL, ACETAMINOPHEN AND CAFFEINE 50; 325; 40 MG/1; MG/1; MG/1
1 TABLET ORAL EVERY 6 HOURS PRN
Qty: 30 TABLET | Refills: 0 | Status: SHIPPED | OUTPATIENT
Start: 2024-07-01

## 2024-07-01 RX ORDER — DIAZEPAM 2 MG/1
TABLET ORAL
Qty: 10 TABLET | Refills: 0 | Status: SHIPPED | OUTPATIENT
Start: 2024-07-01

## 2024-07-01 NOTE — TELEPHONE ENCOUNTER
Rx Refill Note  Requested Prescriptions     Pending Prescriptions Disp Refills    butalbital-acetaminophen-caffeine (FIORICET, ESGIC) -40 MG per tablet [Pharmacy Med Name: [id number] -40] 30 tablet 0     Sig: TAKE 1 TABLET BY MOUTH EVERY 6 HOURS AS NEEDED FOR HEADACHE.    diazePAM (VALIUM) 2 MG tablet [Pharmacy Med Name: DIAZEPAM 2 MG TABLET] 4 tablet 0     Sig: TAKE 1/2 TO 1 TABLET BY MOUTH 1 HOUR BEFORE NEEDED. MAY TAKE AN ADDITIONAL TABLET IF NOT EFFECTIVE.      Last office visit with prescribing clinician: 5/21/2024   Last telemedicine visit with prescribing clinician: Visit date not found   Next office visit with prescribing clinician: 8/13/2024  LF  05/13/24

## 2024-07-02 ENCOUNTER — TELEPHONE (OUTPATIENT)
Dept: NEUROLOGY | Facility: CLINIC | Age: 44
End: 2024-07-02
Payer: COMMERCIAL

## 2024-07-02 NOTE — TELEPHONE ENCOUNTER
Provider: DR. FREEDMAN    Caller: Giana Barraza    Relationship to Patient: Self    Pharmacy: [facility]/PHARMACY #[id number] - [place], [place] - [address] [facility] [phone] [facility] [phone]     Phone Number: [phone] -PLEASE LEAVE DETAILED VM IF UNABLE TO REACH PT DIRECTLY OR SEND [email or web address] MESSAGE.    Reason for Call: PT STATES THAT SHE HAS HAD AN ONGOING MIGRAINE SINCE SUNDAY. SHE IS UNSURE IF THE MIGRAINE IS RELATED TO OCCIPITAL NEURALGIA OR HER FIBROMYALGIA. PT RECEIVES ONB INJECTIONS LAST THURSDAY AND STATES HER INJECTION SITES WERE VERY SENSITIVE FOLLOWING THE PROCEDURE. PT STATES THE EVENING FOLLOWING HER INJECTIONS WAS VERY STRESSFUL AND IS UNSURE IF THIS COULD HAVE BROUGHT ON THE MIGRAINE.    When was the patient last seen: 6/24/2024 FOR FOLLOW-UP; 6/27/2024 FOR ONB INJECTIONS    When is the patient's next appointment: 8/19/2024    When did it start: SUNDAY, 6/30/2024    Where is it located: HEAD    Characteristics of symptom/severity: LIGHT SENSITIVITY AND NAUSEA (PT STATES SHE HAS NOT EATEN MUCH IN THE LAST COUPLE OF DAYS BUT HAS BEEN DRINKING LOTS OF JOSEPH TEA)    Timing- Is it constant or intermittent: CONSTANT    What therapies/medications have you tried: TOOK NARATRIPTAN ON SUNDAY AND MONDAY (YESTERDAY) WITH LITTLE RELIEF.    Additional info/questions: PT IS WONDERING IF IT IS SAFE FOR HER TO TAKE THE NARATRIPTAN THREE DAYS IN A ROW OR WOULD LIKE TO KNOW IF DR. FREEDMAN HAS ANY OTHER RECOMMENDATIONS IN REGARDS TO HER CURRENT MIGRAINE.    ATTEMPTED TO WARM-TRANSFER CALL. PER RALPH PARISI NOT AVAILABLE AT THE TIME OF CALL.    PLEASE REVIEW AND ADVISE.

## 2024-07-08 ENCOUNTER — SPECIALTY PHARMACY (OUTPATIENT)
Dept: NEUROLOGY | Facility: CLINIC | Age: 44
End: 2024-07-08
Payer: COMMERCIAL

## 2024-07-08 NOTE — TELEPHONE ENCOUNTER
Caller: RONDA     Relationship:SELF    Callback number:   Telephone Information:   Mobile [phone]        Is it ok to leave a message: [x] Yes [] No    Requested medication for samples: NURTEC AND UBRELVY    How much medication does the patient currently have left: COMPLETELY OUT     Who will be picking up the samples: PATIENT     Do you need information about patient financial assistance for this medication: [] Yes [x] No

## 2024-07-17 DIAGNOSIS — G43.009 MIGRAINE WITHOUT AURA AND WITHOUT STATUS MIGRAINOSUS, NOT INTRACTABLE: ICD-10-CM

## 2024-07-17 RX ORDER — AMITRIPTYLINE HYDROCHLORIDE 25 MG/1
25 TABLET, FILM COATED ORAL NIGHTLY
Qty: 30 TABLET | Refills: 2 | Status: SHIPPED | OUTPATIENT
Start: 2024-07-17

## 2024-08-11 DIAGNOSIS — Z30.011 ENCOUNTER FOR INITIAL PRESCRIPTION OF CONTRACEPTIVE PILLS: ICD-10-CM

## 2024-08-12 RX ORDER — DROSPIRENONE AND ETHINYL ESTRADIOL 0.02-3(28)
1 KIT ORAL DAILY
Qty: 84 TABLET | Refills: 3 | Status: SHIPPED | OUTPATIENT
Start: 2024-08-12 | End: 2024-08-13

## 2024-08-13 ENCOUNTER — OFFICE VISIT (OUTPATIENT)
Dept: INTERNAL MEDICINE | Facility: CLINIC | Age: 44
End: 2024-08-13
Payer: COMMERCIAL

## 2024-08-13 VITALS
WEIGHT: 193 LBS | BODY MASS INDEX: 32.15 KG/M2 | HEART RATE: 80 BPM | SYSTOLIC BLOOD PRESSURE: 122 MMHG | OXYGEN SATURATION: 98 % | HEIGHT: 65 IN | TEMPERATURE: 98.7 F | DIASTOLIC BLOOD PRESSURE: 74 MMHG

## 2024-08-13 DIAGNOSIS — J30.1 SEASONAL ALLERGIC RHINITIS DUE TO POLLEN: ICD-10-CM

## 2024-08-13 DIAGNOSIS — M79.7 FIBROMYALGIA: ICD-10-CM

## 2024-08-13 DIAGNOSIS — R12 HEARTBURN: ICD-10-CM

## 2024-08-13 DIAGNOSIS — G43.009 MIGRAINE WITHOUT AURA AND WITHOUT STATUS MIGRAINOSUS, NOT INTRACTABLE: ICD-10-CM

## 2024-08-13 DIAGNOSIS — Z12.4 SCREENING FOR MALIGNANT NEOPLASM OF CERVIX: ICD-10-CM

## 2024-08-13 DIAGNOSIS — E78.5 HYPERLIPIDEMIA, UNSPECIFIED HYPERLIPIDEMIA TYPE: ICD-10-CM

## 2024-08-13 DIAGNOSIS — Z00.00 ANNUAL PHYSICAL EXAM: Primary | ICD-10-CM

## 2024-08-13 DIAGNOSIS — Z01.419 ROUTINE GYNECOLOGICAL EXAMINATION: ICD-10-CM

## 2024-08-13 DIAGNOSIS — Z12.31 ENCOUNTER FOR SCREENING MAMMOGRAM FOR MALIGNANT NEOPLASM OF BREAST: ICD-10-CM

## 2024-08-13 DIAGNOSIS — Q05.8: ICD-10-CM

## 2024-08-13 DIAGNOSIS — M54.81 BILATERAL OCCIPITAL NEURALGIA: ICD-10-CM

## 2024-08-13 DIAGNOSIS — Z30.9 ENCOUNTER FOR CONTRACEPTIVE MANAGEMENT, UNSPECIFIED TYPE: ICD-10-CM

## 2024-08-13 DIAGNOSIS — Z13.1 SCREENING FOR DIABETES MELLITUS: ICD-10-CM

## 2024-08-13 DIAGNOSIS — R13.19 ESOPHAGEAL DYSPHAGIA: ICD-10-CM

## 2024-08-13 DIAGNOSIS — Z13.89 SCREENING FOR BLOOD OR PROTEIN IN URINE: ICD-10-CM

## 2024-08-13 PROBLEM — R00.2 PALPITATIONS: Status: RESOLVED | Noted: 2020-12-03 | Resolved: 2024-08-13

## 2024-08-13 PROBLEM — E66.811 CLASS 1 OBESITY DUE TO EXCESS CALORIES WITHOUT SERIOUS COMORBIDITY WITH BODY MASS INDEX (BMI) OF 32.0 TO 32.9 IN ADULT: Status: ACTIVE | Noted: 2023-08-07

## 2024-08-13 PROCEDURE — 99396 PREV VISIT EST AGE 40-64: CPT | Performed by: FAMILY MEDICINE

## 2024-08-13 RX ORDER — MONTELUKAST SODIUM 10 MG/1
TABLET ORAL
Qty: 60 TABLET | Refills: 1 | Status: SHIPPED | OUTPATIENT
Start: 2024-08-13

## 2024-08-13 RX ORDER — ACETAMINOPHEN AND CODEINE PHOSPHATE 120; 12 MG/5ML; MG/5ML
1 SOLUTION ORAL DAILY
Qty: 90 TABLET | Refills: 3 | Status: SHIPPED | OUTPATIENT
Start: 2024-08-13

## 2024-08-13 NOTE — PROGRESS NOTES
Date of Encounter: 2024  Patient: Giana Barraza,  1980    Subjective   History of Presenting Illness  Chief complaint: Annual physical     No acute complaints.    Migraines with aura, complicated with occipital neuralgia.  Currently managing with amitriptyline, propranolol, Ubrelvy, naratriptan.  She is still having frequent migraines with burden of at least 1/week, after her Botox injections she also gets migraines in a cluster fashion.  MRI of the brain did not show any significant abnormalities.  She does have a menstrual timing to some of her migraines, and these do affect her fibromyalgia.  Some of her headaches may be sinus in nature as well, only getting modest relief with cetirizine.  Strong family history of allergic rhinitis in children.  Has used nasal corticosteroids.  No history of allergy shots.  She is currently following with neurology.      She is on combined contraception.  Some menstrual cycle shortening but no other deidre perimenopausal symptoms.     GERD, with history of esophageal dysphagia, EGD  showed grade C esophagitis.  She is currently managing this with vonoprazan which she finds efficacious and more convenient than PPIs.      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  No bowel or bladder problems.  No significant pain.     Lives with . 2 children, 5 and 8.  Never smoker.  Does not drink alcohol.  Goal is to exercise by walking a few times per week although this is difficult this season.  Fairly healthy diet, tries to minimize excess sugars due inflammation and fibromyalgia. Last Pap smear , due today. Due for mammography. Manages the home and homeschools her children. Discussed indicated vaccinations.    The following portions of the patient's history were reviewed and updated as appropriate: allergies, current medications, past family history, past  medical history, past social history, past surgical history and problem list.    Patient Active Problem List   Diagnosis    Fibromyalgia    Lipomyelomeningocele    Congenital sacral meningocele    Heartburn    Seasonal allergic rhinitis    Class 1 obesity due to excess calories without serious comorbidity with body mass index (BMI) of 32.0 to 32.9 in adult    Esophageal dysphagia    Family history of colon cancer    Migraine without aura and without status migrainosus, not intractable    Bilateral occipital neuralgia    Hyperlipidemia     Past Medical History:   Diagnosis Date    Bilateral occipital neuralgia 06/24/2024    Chest wall discomfort 12/03/2020    Fibromyalgia, primary     GERD (gastroesophageal reflux disease) Aug 2021    Headache     Headache, tension-type Elijah    Hyperlipidemia 8/13/2024    Migraine Elijah    Palpitation 12/03/2020    Palpitations 12/03/2020     Past Surgical History:   Procedure Laterality Date    COLONOSCOPY N/A 04/29/2024    Procedure: COLONOSCOPY;  Surgeon: Avery Francois MD;  Location: [facility];  Service: Gastroenterology;  Laterality: N/A;  Diverticulosis    DILATATION AND CURETTAGE      ENDOSCOPY N/A 04/29/2024    Procedure: ESOPHAGOGASTRODUODENOSCOPY WITH BIOPSY;  Surgeon: Avery Francois MD;  Location: [facility];  Service: Gastroenterology;  Laterality: N/A;  Erosive esophagitis; Biopsies- gastric, distal esophagus    HERNIA REPAIR      Months after birth     Family History   Problem Relation Age of Onset    Colon cancer Mother     No Known Problems Father     Heart disease Paternal Grandfather        Current Outpatient Medications:     acetaminophen (TYLENOL) 325 MG tablet, Take 2 tablets by mouth Every 6 (Six) Hours As Needed for Mild Pain., Disp: , Rfl:     amitriptyline (ELAVIL) 25 MG tablet, Take 1 tablet by mouth Every Night., Disp: 30 tablet, Rfl: 2    cetirizine (zyrTEC) 10 MG tablet, Take 1 tablet by mouth Daily., Disp: , Rfl:     Magnesium 100  "MG tablet, Take  by mouth., Disp: , Rfl:     melatonin 1 MG tablet, Take  by mouth., Disp: , Rfl:     multivitamin with minerals tablet tablet, Take 1 tablet by mouth Daily., Disp: , Rfl:     naratriptan (AMERGE) 2.5 MG tablet, Take 1 tablet by mouth As Needed for Migraine., Disp: 9 tablet, Rfl: 2    PROBIOTIC PRODUCT PO, qhs, Disp: , Rfl:     propranolol (INDERAL) 20 MG tablet, Take 1 tablet by mouth 2 (Two) Times a Day., Disp: 60 tablet, Rfl: 2    ubrogepant (Ubrelvy) 100 MG tablet, Take 1 tablet by mouth 1 (One) Time As Needed (for migraine headache). May repeat dose in two hours if needed. Max of 2 tablets in 24 hours., Disp: 10 tablet, Rfl: 3    VITAMIN D PO, Vitamin D  once daily, Disp: , Rfl:     Vonoprazan Fumarate (Voquezna) 20 MG tablet, Take 1 tablet by mouth Daily., Disp: 30 tablet, Rfl: 1    montelukast (SINGULAIR) 10 MG tablet, Take 1 tablet daily for allergies, Disp: 60 tablet, Rfl: 1    norethindrone (Ortho Micronor) 0.35 MG tablet, Take 1 tablet by mouth Daily., Disp: 90 tablet, Rfl: 3  No Known Allergies  Social History     Tobacco Use    Smoking status: Never    Smokeless tobacco: Never   Vaping Use    Vaping status: Never Used   Substance Use Topics    Alcohol use: Not Currently    Drug use: Never          Objective   Physical Exam  Vitals:    08/13/24 0911   BP: 122/74   BP Location: Left arm   Patient Position: Sitting   Cuff Size: Large Adult   Pulse: 80   Temp: 98.7 °F (37.1 °C)   TempSrc: Infrared   SpO2: 98%   Weight: 87.5 kg (193 lb)   Height: 165.1 cm (65\")     Body mass index is 32.12 kg/m².    Constitutional: NAD.  Eyes: EOMI. PERRLA. Normal conjunctiva.  Ear, nose, mouth, throat: No tonsillar exudates or erythema.   Normal nasal mucosa. Normal external ear canals and TMs bilaterally.  Cardiovascular: RRR. No murmurs. No LE edema b/l. Radial pulses 2+ bilaterally.  Pulmonary: CTA b/l. Good effort.  Integumentary: No rashes or wounds on face or upper extremities.  Lymphatic: No anterior " cervical lymphadenopathy.  Endocrine: No thyromegaly or palpable thyroid nodules.  Psychiatric: Normal affect. Normal thought content.  Gastrointestinal: Nondistended. No hepatosplenomegaly. No focal tenderness to palpation. Normal bowel sounds.  Genitourinary: Normal external and internal female genitalia.  Pap smear collected.  Patient tolerated well.     Assessment & Plan   Assessment and Plan  Pleasant 44-year-old female with fibromyalgia, GERD, migraines, asymptomatic congenital sacral meningocele, allergic rhinitis, who presents with the following:     Diagnoses and all orders for this visit:    1. Annual physical exam (Primary): We discussed preventative care including age and patient-appropriate immunizations and cancer screening. We also discussed the importance of exercise and a healthy diet. This is their annual preventative exam.    2. Migraine without aura and without status migrainosus, not intractable: These are not optimally controlled and unfortunately she has significant interplay and confounding by her fibromyalgia, allergic rhinitis, and occipital neuralgia.  I want to add montelukast to see if that helps with her allergic rhinitis and her sinus related headaches.  If not effective she may want to consider immunotherapy.  For migraines continue to be uncontrolled I would consider fibromyalgia medications that are also third line migraine medication such as gabapentin and Lyrica.  Continue to follow with neurology.  -     Vitamin D,25-Hydroxy  -     Vitamin B12  -     Ambulatory Referral to Gynecology    3. Bilateral occipital neuralgia: Responsive to Botox, although makes migraines worse    4. Encounter for contraceptive management, unspecified type: Although there appears to be lower risks with low-dose estrogens and modern OCPs, we opted to switch to norethindrone for contraception.  Discussed alternative options such as IUD, Nexplanon, Depo-Provera, continued ultralow dose estrogen OCP.  She  does want to follow with gynecology longitudinally so I will arrange a referral.  -     norethindrone (Ortho Micronor) 0.35 MG tablet; Take 1 tablet by mouth Daily.  Dispense: 90 tablet; Refill: 3  -     Ambulatory Referral to Gynecology    5. Fibromyalgia: Per #2, would consider something like gabapentin or pregabalin which may help migraines if not improving  -     Sedimentation Rate  -     C-reactive Protein  -     Vitamin D,25-Hydroxy  -     Vitamin B12    6. Heartburn: Continue potassium competitive acid blocker through gastroenterology  7. Esophageal dysphagia    8. Hyperlipidemia, unspecified hyperlipidemia type: Minimal 10-year ASCVD  -     CBC & Differential  -     Comprehensive Metabolic Panel  -     Lipid Panel  -     TSH  -     T4, Free  -     T3, Free    9. Seasonal allergic rhinitis due to pollen: Per #2  -     montelukast (SINGULAIR) 10 MG tablet; Take 1 tablet daily for allergies  Dispense: 60 tablet; Refill: 1    10. Congenital sacral meningocele: Neurosurgery does not recommend intervention unless symptomatic    11. Encounter for screening mammogram for malignant neoplasm of breast  -     Mammo Screening Digital Tomosynthesis Bilateral With CAD; Future    12. Screening for malignant neoplasm of cervix  -     IGP,CtNgTv,Apt HPV,rfx 16 / 18,45    13. Screening for diabetes mellitus  -     Hemoglobin A1c    14. Screening for blood or protein in urine  -     Urinalysis With Microscopic - Urine, Clean Catch    15. Routine gynecological examination  -     Ambulatory Referral to Gynecology    BMI is >= 30 and <35. (Class 1 Obesity). The following options were offered after discussion;: exercise counseling/recommendations, nutrition counseling/recommendations, and information on healthy weight added to patient's after visit summary     Follow up in 4 months due to chronic migraines and fibromyalgia    Carter Ellis MD  Family Medicine  O: [phone]    Disclaimer: Parts of this note were dictated by  speech recognition. Minor errors in transcription may be present. Please call if questions.

## 2024-08-14 LAB
25(OH)D3+25(OH)D2 SERPL-MCNC: 42.3 NG/ML (ref 30–100)
ALBUMIN SERPL-MCNC: 4.3 G/DL (ref 3.9–4.9)
ALP SERPL-CCNC: 62 IU/L (ref 44–121)
ALT SERPL-CCNC: 20 IU/L (ref 0–32)
APPEARANCE UR: CLEAR
AST SERPL-CCNC: 20 IU/L (ref 0–40)
BACTERIA #/AREA URNS HPF: NORMAL /[HPF]
BASOPHILS # BLD AUTO: 0.1 X10E3/UL (ref 0–0.2)
BASOPHILS NFR BLD AUTO: 1 %
BILIRUB SERPL-MCNC: 0.3 MG/DL (ref 0–1.2)
BILIRUB UR QL STRIP: NEGATIVE
BUN SERPL-MCNC: 12 MG/DL (ref 6–24)
BUN/CREAT SERPL: 14 (ref 9–23)
CALCIUM SERPL-MCNC: 9.7 MG/DL (ref 8.7–10.2)
CASTS URNS QL MICRO: NORMAL /LPF
CHLORIDE SERPL-SCNC: 101 MMOL/L (ref 96–106)
CHOLEST SERPL-MCNC: 243 MG/DL (ref 100–199)
CO2 SERPL-SCNC: 20 MMOL/L (ref 20–29)
COLOR UR: YELLOW
CREAT SERPL-MCNC: 0.84 MG/DL (ref 0.57–1)
CRP SERPL-MCNC: 19 MG/L (ref 0–10)
EGFRCR SERPLBLD CKD-EPI 2021: 88 ML/MIN/1.73
EOSINOPHIL # BLD AUTO: 0.1 X10E3/UL (ref 0–0.4)
EOSINOPHIL NFR BLD AUTO: 1 %
EPI CELLS #/AREA URNS HPF: NORMAL /HPF (ref 0–10)
ERYTHROCYTE [DISTWIDTH] IN BLOOD BY AUTOMATED COUNT: 11.9 % (ref 11.7–15.4)
ERYTHROCYTE [SEDIMENTATION RATE] IN BLOOD BY WESTERGREN METHOD: 15 MM/HR (ref 0–32)
GLOBULIN SER CALC-MCNC: 3 G/DL (ref 1.5–4.5)
GLUCOSE SERPL-MCNC: 91 MG/DL (ref 70–99)
GLUCOSE UR QL STRIP: NEGATIVE
HBA1C MFR BLD: 5.4 % (ref 4.8–5.6)
HCT VFR BLD AUTO: 42.5 % (ref 34–46.6)
HDLC SERPL-MCNC: 89 MG/DL
HGB BLD-MCNC: 13.8 G/DL (ref 11.1–15.9)
HGB UR QL STRIP: NEGATIVE
IMM GRANULOCYTES # BLD AUTO: 0 X10E3/UL (ref 0–0.1)
IMM GRANULOCYTES NFR BLD AUTO: 0 %
KETONES UR QL STRIP: NEGATIVE
LDLC SERPL CALC-MCNC: 123 MG/DL (ref 0–99)
LEUKOCYTE ESTERASE UR QL STRIP: NEGATIVE
LYMPHOCYTES # BLD AUTO: 1.8 X10E3/UL (ref 0.7–3.1)
LYMPHOCYTES NFR BLD AUTO: 24 %
MCH RBC QN AUTO: 29.9 PG (ref 26.6–33)
MCHC RBC AUTO-ENTMCNC: 32.5 G/DL (ref 31.5–35.7)
MCV RBC AUTO: 92 FL (ref 79–97)
MICRO URNS: NORMAL
MICRO URNS: NORMAL
MONOCYTES # BLD AUTO: 0.3 X10E3/UL (ref 0.1–0.9)
MONOCYTES NFR BLD AUTO: 4 %
NEUTROPHILS # BLD AUTO: 5.3 X10E3/UL (ref 1.4–7)
NEUTROPHILS NFR BLD AUTO: 70 %
NITRITE UR QL STRIP: NEGATIVE
PH UR STRIP: 6.5 [PH] (ref 5–7.5)
PLATELET # BLD AUTO: 347 X10E3/UL (ref 150–450)
POTASSIUM SERPL-SCNC: 4.4 MMOL/L (ref 3.5–5.2)
PROT SERPL-MCNC: 7.3 G/DL (ref 6–8.5)
PROT UR QL STRIP: NEGATIVE
RBC # BLD AUTO: 4.62 X10E6/UL (ref 3.77–5.28)
RBC #/AREA URNS HPF: NORMAL /HPF (ref 0–2)
SODIUM SERPL-SCNC: 139 MMOL/L (ref 134–144)
SP GR UR STRIP: 1.01 (ref 1–1.03)
T3FREE SERPL-MCNC: 3.8 PG/ML (ref 2–4.4)
T4 FREE SERPL-MCNC: 1 NG/DL (ref 0.82–1.77)
TRIGL SERPL-MCNC: 180 MG/DL (ref 0–149)
TSH SERPL DL<=0.005 MIU/L-ACNC: 0.76 UIU/ML (ref 0.45–4.5)
UROBILINOGEN UR STRIP-MCNC: 0.2 MG/DL (ref 0.2–1)
VIT B12 SERPL-MCNC: 429 PG/ML (ref 232–1245)
VLDLC SERPL CALC-MCNC: 31 MG/DL (ref 5–40)
WBC # BLD AUTO: 7.6 X10E3/UL (ref 3.4–10.8)
WBC #/AREA URNS HPF: NORMAL /HPF (ref 0–5)

## 2024-08-15 DIAGNOSIS — K21.00 GASTROESOPHAGEAL REFLUX DISEASE WITH ESOPHAGITIS, UNSPECIFIED WHETHER HEMORRHAGE: Primary | ICD-10-CM

## 2024-08-15 RX ORDER — VONOPRAZAN FUMARATE 13.36 MG/1
10 TABLET ORAL DAILY
Qty: 30 TABLET | Refills: 11 | Status: SHIPPED | OUTPATIENT
Start: 2024-08-15

## 2024-08-16 NOTE — PROGRESS NOTES
Cholesterol up a bit from last year but overall not in the range where I would recommend medication at this time.  Continue to work on reducing red meat in your diet and mammal byproducts.    One of your inflammatory markers was mildly elevated, but the other 1 was negative.  Hard to tell whether this means anything.  If he cannot get your headaches under control please follow-up with me.     Everything else was completely normal

## 2024-08-19 ENCOUNTER — OFFICE VISIT (OUTPATIENT)
Dept: NEUROLOGY | Facility: CLINIC | Age: 44
End: 2024-08-19
Payer: COMMERCIAL

## 2024-08-19 VITALS
HEIGHT: 65 IN | DIASTOLIC BLOOD PRESSURE: 78 MMHG | SYSTOLIC BLOOD PRESSURE: 132 MMHG | WEIGHT: 194 LBS | HEART RATE: 78 BPM | OXYGEN SATURATION: 98 % | BODY MASS INDEX: 32.32 KG/M2

## 2024-08-19 DIAGNOSIS — G43.009 MIGRAINE WITHOUT AURA AND WITHOUT STATUS MIGRAINOSUS, NOT INTRACTABLE: Primary | ICD-10-CM

## 2024-08-19 PROCEDURE — 99214 OFFICE O/P EST MOD 30 MIN: CPT | Performed by: PSYCHIATRY & NEUROLOGY

## 2024-08-19 RX ORDER — PROPRANOLOL HYDROCHLORIDE 20 MG/1
20 TABLET ORAL 2 TIMES DAILY
Qty: 60 TABLET | Refills: 2 | Status: SHIPPED | OUTPATIENT
Start: 2024-08-19

## 2024-08-19 RX ORDER — AMITRIPTYLINE HYDROCHLORIDE 25 MG/1
25 TABLET, FILM COATED ORAL NIGHTLY
Qty: 30 TABLET | Refills: 2 | Status: SHIPPED | OUTPATIENT
Start: 2024-08-19

## 2024-08-19 RX ORDER — FREMANEZUMAB-VFRM 225 MG/1.5ML
225 INJECTION SUBCUTANEOUS ONCE
Qty: 1.68 ML | Refills: 0 | COMMUNITY
Start: 2024-08-19 | End: 2024-08-19

## 2024-08-19 RX ORDER — FREMANEZUMAB-VFRM 225 MG/1.5ML
225 INJECTION SUBCUTANEOUS
Qty: 1.68 ML | Refills: 2 | Status: SHIPPED | OUTPATIENT
Start: 2024-08-19

## 2024-08-19 NOTE — PROGRESS NOTES
Chief Complaint  Migraine (Onb not helpful- reports discomfort still- around that time she had some emotional distress- unsure if that increased all her symptoms- no nartriptain- taking ubrelvy- averaging 1-2migraines a week- )    Subjective          Giana Barraza presents to [facility] for   HISTORY OF PRESENT ILLNESS:    Giana Barraza is a 44 year old right handed woman who returns to neurology clinic for follow up evaluation and treatment of migraines.  She reports a history of migraines starting in her late 20's.  It seemed to improve with changing her birth control until over the past 3-4 years since moving to [place], [place].  She tells me she has been diagnosed with fibromyalgia around 5 years ago after having her daughter.  She has headaches involving the back of her head and also her temples and front of her head which have increased since 3/2024 and has been more consistent.  The pain is typically dull and achy which she rates as 8/10 on pain scale 1-10 when most severe with associated light and sound sensitivity along with nausea without vomiting.  They can last up to 2-3 days in duration.  She is waking up with some discomfort every day.  She is currently getting 1 headache day per week and previously 2-3 headache days per week.  She had a brain MRI scan on 6/17/2024 which does not demonstrate any acute intracranial abnormalities with minimal white matter changes.  She denies any family history of migraines.  She does report having menstrual related migraines.  She has noticed that barometric pressure changes can also effect her as well.  She is currently on amitriptyline 25 mg at bedtime along with magnesium and Co-Q10 and melatonin.  She has tried sumatriptan which was not helpful.  She has tried Flexeril in the past.  She was also given Nurtec ODT and Ubrelvy samples which dulled her headaches and allows her to function.  She thinks that Aleve helps and she has tried  "Esgic which is ok but not great.  She does think Tylenol also helps at times.  She has had some heart palpitations with the naratriptan so she stopped using this medicine.  She does report having significant tension in her muscles in her neck and shoulders.  She does report having some anxiety.  She is currently on birth control that is non-estrogen based.  Her  has had a vasectomy.      Past Medical History:   Diagnosis Date    Bilateral occipital neuralgia 06/24/2024    Chest wall discomfort 12/03/2020    Fibromyalgia, primary     GERD (gastroesophageal reflux disease) Aug 2021    Headache     Headache, tension-type Elijah    Hyperlipidemia 8/13/2024    Migraine Elijah    Palpitation 12/03/2020    Palpitations 12/03/2020        Family History   Problem Relation Age of Onset    Colon cancer Mother     No Known Problems Father     Heart disease Paternal Grandfather         Social History     Socioeconomic History    Marital status:    Tobacco Use    Smoking status: Never    Smokeless tobacco: Never   Vaping Use    Vaping status: Never Used   Substance and Sexual Activity    Alcohol use: Not Currently    Drug use: Never    Sexual activity: Defer        I have reviewed and confirmed the accuracy of the ROS as documented by the MA/LPN/RN Enrique Ng MD   Review of Systems   Neurological:  Positive for dizziness and headache. Negative for tremors, seizures, syncope, facial asymmetry, speech difficulty, weakness, light-headedness, numbness, memory problem and confusion.   Psychiatric/Behavioral:  Negative for agitation, behavioral problems, decreased concentration, dysphoric mood, hallucinations, self-injury, sleep disturbance, suicidal ideas, negative for hyperactivity, depressed mood and stress. The patient is not nervous/anxious.         Objective   Vital Signs:   /78   Pulse 78   Ht 165.1 cm (65\")   Wt 88 kg (194 lb)   SpO2 98%   BMI 32.28 kg/m²       PHYSICAL EXAM:    General   Mental Status - " Alert. General Appearance - Well developed, Well groomed, Oriented and Cooperative. Orientation - Oriented X3.       Head and Neck  Head - normocephalic, atraumatic with no lesions or palpable masses.  Neck    Global Assessment - supple.       Eye   Sclera/Conjunctiva - Bilateral - Normal.    ENMT  Mouth and Throat   Oral Cavity/Oropharynx: Oropharynx - the soft palate,uvula and tongue are normal in appearance.    Chest and Lung Exam   Chest - lung clear to auscultation bilaterally.    Cardiovascular   Cardiovascular examination reveals  - normal heart sounds, regular rate and rhythm.    Neurologic   Mental Status: Speech - Normal. Cognitive function - appropriate fund of knowledge. No impairment of attention, Impairment of concentration, impairment of long term memory or impairment of short term memory.  Cranial Nerves:   II Optic: Visual acuity - Left - Normal. Right - Normal. Visual fields - Normal (to confrontation).  III Oculomotor: Pupillary constriction - Left - Normal. Right - Normal.  VII Facial: - Normal Bilaterally.   IX Glossopharyngeal / X Vagus - Normal.  XI Accessory: Trapezius - Bilateral - Normal. Sternocleidomastoid - Bilateral - Normal.  XII Hypoglossal - Bilateral - Normal.  Eye Movements: - Normal Bilaterally.  Sensory:   Light Touch: Intact - Globally.  Motor:   Bulk and Contour: - Normal.  Tone: - Normal.  Tremor: Not present.  Strength: 5/5 normal muscle strength - All Muscles.   General Assessment of Reflexes: - deep tendon reflexes are normal. Coordination - No Impairment of finger-to-nose or Impairment of rapid alternating movements. Gait - Normal.       Result Review :                 Assessment and Plan    Problem List Items Addressed This Visit       Migraine without aura and without status migrainosus, not intractable - Primary    Current Assessment & Plan     44 year old right handed woman with migraines which have not resolved and have not responded to ONBs and number of medications  and she would like further assistance.  She reports a history of migraines starting in her late 20's.  It seemed to improve with changing her birth control until over the past 3-4 years since moving to [place], [place].  She tells me she has been diagnosed with fibromyalgia around 5 years ago after having her daughter.  She has headaches involving the back of her head and also her temples and front of her head which have increased since 3/2024 and has been more consistent.  The pain is typically dull and achy which she rates as 8/10 on pain scale 1-10 when most severe with associated light and sound sensitivity along with nausea without vomiting.  They can last up to 2-3 days in duration.  She is waking up with some discomfort every day.  She is currently getting 1 headache day per week and previously 2-3 headache days per week.  She had a brain MRI scan on 6/17/2024 which does not demonstrate any acute intracranial abnormalities with minimal white matter changes.  She denies any family history of migraines.  She does report having menstrual related migraines.  She has noticed that barometric pressure changes can also effect her as well.  She is currently on amitriptyline 25 mg at bedtime along with magnesium and Co-Q10 and melatonin.  She has tried sumatriptan which was not helpful.  She has tried Flexeril in the past.  She was also given Nurtec ODT and Ubrelvy samples which dulled her headaches and allows her to function.  She thinks that Aleve helps and she has tried Esgic which is ok but not great.  She does think Tylenol also helps at times.  She has had some heart palpitations with the naratriptan so she stopped using this medicine.  She does report having significant tension in her muscles in her neck and shoulders.  She does report having some anxiety.  She is currently on birth control that is non-estrogen based.  Her  has had a vasectomy.  I will start her on Ajovy today for further assistance with  migraine prevention and continue the Ubrelvy for acute treatment.  Again advised her not to get pregnant on these medicines as they can potentially cause birth defects.  Discussed migraine triggers and lifestyle modifications.             Relevant Medications    Fremanezumab-vfrm (Ajovy) 225 MG/1.5ML solution auto-injector    Fremanezumab-vfrm (Ajovy) 225 MG/1.5ML solution auto-injector    ubrogepant (Ubrelvy) 100 MG tablet    amitriptyline (ELAVIL) 25 MG tablet    propranolol (INDERAL) 20 MG tablet       Follow Up   Return in about 3 months (around 11/19/2024).  Patient was given instructions and counseling regarding her condition or for health maintenance advice. Please see specific information pulled into the AVS if appropriate.

## 2024-08-19 NOTE — ASSESSMENT & PLAN NOTE
44 year old right handed woman with migraines which have not resolved and have not responded to ONBs and number of medications and she would like further assistance.  She reports a history of migraines starting in her late 20's.  It seemed to improve with changing her birth control until over the past 3-4 years since moving to [place], [place].  She tells me she has been diagnosed with fibromyalgia around 5 years ago after having her daughter.  She has headaches involving the back of her head and also her temples and front of her head which have increased since 3/2024 and has been more consistent.  The pain is typically dull and achy which she rates as 8/10 on pain scale 1-10 when most severe with associated light and sound sensitivity along with nausea without vomiting.  They can last up to 2-3 days in duration.  She is waking up with some discomfort every day.  She is currently getting 1 headache day per week and previously 2-3 headache days per week.  She had a brain MRI scan on 6/17/2024 which does not demonstrate any acute intracranial abnormalities with minimal white matter changes.  She denies any family history of migraines.  She does report having menstrual related migraines.  She has noticed that barometric pressure changes can also effect her as well.  She is currently on amitriptyline 25 mg at bedtime along with magnesium and Co-Q10 and melatonin.  She has tried sumatriptan which was not helpful.  She has tried Flexeril in the past.  She was also given Nurtec ODT and Ubrelvy samples which dulled her headaches and allows her to function.  She thinks that Aleve helps and she has tried Esgic which is ok but not great.  She does think Tylenol also helps at times.  She has had some heart palpitations with the naratriptan so she stopped using this medicine.  She does report having significant tension in her muscles in her neck and shoulders.  She does report having some anxiety.  She is currently on birth  control that is non-estrogen based.  Her  has had a vasectomy.  I will start her on Ajovy today for further assistance with migraine prevention and continue the Ubrelvy for acute treatment.  Again advised her not to get pregnant on these medicines as they can potentially cause birth defects.  Discussed migraine triggers and lifestyle modifications.

## 2024-08-20 LAB
C TRACH RRNA CVX QL NAA+PROBE: NEGATIVE
CYTOLOGIST CVX/VAG CYTO: ABNORMAL
CYTOLOGY CVX/VAG DOC CYTO: ABNORMAL
CYTOLOGY CVX/VAG DOC THIN PREP: ABNORMAL
DX ICD CODE: ABNORMAL
HPV GENOTYPE REFLEX: ABNORMAL
HPV I/H RISK 4 DNA CVX QL PROBE+SIG AMP: POSITIVE
HPV16 DNA CVX QL PROBE+SIG AMP: NEGATIVE
HPV18+45 E6+E7 MRNA CVX QL NAA+PROBE: NEGATIVE
Lab: ABNORMAL
Lab: ABNORMAL
N GONORRHOEA RRNA CVX QL NAA+PROBE: NEGATIVE
OTHER STN SPEC: ABNORMAL
STAT OF ADQ CVX/VAG CYTO-IMP: ABNORMAL
T VAGINALIS RRNA SPEC QL NAA+PROBE: NEGATIVE

## 2024-08-21 DIAGNOSIS — K21.00 GASTROESOPHAGEAL REFLUX DISEASE WITH ESOPHAGITIS, UNSPECIFIED WHETHER HEMORRHAGE: ICD-10-CM

## 2024-08-22 PROBLEM — R87.810 PAPANICOLAOU SMEAR OF CERVIX WITH POSITIVE HIGH RISK HUMAN PAPILLOMA VIRUS (HPV) TEST: Status: ACTIVE | Noted: 2024-08-22

## 2024-08-22 RX ORDER — VONOPRAZAN FUMARATE 13.36 MG/1
10 TABLET ORAL DAILY
Qty: 30 TABLET | Refills: 2 | Status: SHIPPED | OUTPATIENT
Start: 2024-08-22

## 2024-08-27 ENCOUNTER — PROCEDURE VISIT (OUTPATIENT)
Dept: OBSTETRICS AND GYNECOLOGY | Facility: CLINIC | Age: 44
End: 2024-08-27
Payer: COMMERCIAL

## 2024-08-27 ENCOUNTER — OFFICE VISIT (OUTPATIENT)
Dept: OBSTETRICS AND GYNECOLOGY | Facility: CLINIC | Age: 44
End: 2024-08-27
Payer: COMMERCIAL

## 2024-08-27 VITALS
BODY MASS INDEX: 32.49 KG/M2 | WEIGHT: 195 LBS | SYSTOLIC BLOOD PRESSURE: 142 MMHG | HEIGHT: 65 IN | HEART RATE: 82 BPM | DIASTOLIC BLOOD PRESSURE: 91 MMHG

## 2024-08-27 DIAGNOSIS — Z12.31 BREAST CANCER SCREENING BY MAMMOGRAM: Primary | ICD-10-CM

## 2024-08-27 DIAGNOSIS — Z12.31 VISIT FOR SCREENING MAMMOGRAM: Primary | ICD-10-CM

## 2024-08-27 PROCEDURE — 77067 SCR MAMMO BI INCL CAD: CPT | Performed by: OBSTETRICS & GYNECOLOGY

## 2024-08-27 PROCEDURE — 77063 BREAST TOMOSYNTHESIS BI: CPT | Performed by: OBSTETRICS & GYNECOLOGY

## 2024-08-27 NOTE — PROGRESS NOTES
Establish to start mammograms     HPI     Giana Barraza is a 44 y.o.  who presents to the office for initial evaluation. Desires to start mammograms for screening. Did pap with primary care. On progestin only pills due to migraines for contraception. No family hx of breast cancer.     Past Medical History:   Diagnosis Date    Bilateral occipital neuralgia 2024    Chest wall discomfort 2020    Fibromyalgia, primary     GERD (gastroesophageal reflux disease) Aug 2021    Headache     Headache, tension-type Elijah    Hyperlipidemia 2024    Migraine Elijah    Palpitation 2020    Palpitations 2020    PMS (premenstrual syndrome)     Recurrent pregnancy loss, antepartum condition or complication     Varicella      Past Surgical History:   Procedure Laterality Date    COLONOSCOPY N/A 2024    Procedure: COLONOSCOPY;  Surgeon: Avery Francois MD;  Location: [facility];  Service: Gastroenterology;  Laterality: N/A;  Diverticulosis    D & C WITH SUCTION  2014    DILATATION AND CURETTAGE      ENDOSCOPY N/A 2024    Procedure: ESOPHAGOGASTRODUODENOSCOPY WITH BIOPSY;  Surgeon: Avery Francois MD;  Location: [facility];  Service: Gastroenterology;  Laterality: N/A;  Erosive esophagitis; Biopsies- gastric, distal esophagus    HERNIA REPAIR      Months after birth     Family History   Problem Relation Age of Onset    No Known Problems Father     Colon cancer Mother     Heart disease Paternal Grandfather     Breast cancer Neg Hx     Ovarian cancer Neg Hx     Uterine cancer Neg Hx     Deep vein thrombosis Neg Hx     Pulmonary embolism Neg Hx      Social History     Tobacco Use    Smoking status: Never    Smokeless tobacco: Never   Vaping Use    Vaping status: Never Used   Substance Use Topics    Alcohol use: Not Currently    Drug use: Never       Current Outpatient Medications:     acetaminophen (TYLENOL) 325 MG tablet, Take 2 tablets by mouth Every 6 (Six) Hours As  "Needed for Mild Pain., Disp: , Rfl:     amitriptyline (ELAVIL) 25 MG tablet, Take 1 tablet by mouth Every Night., Disp: 30 tablet, Rfl: 2    cetirizine (zyrTEC) 10 MG tablet, Take 1 tablet by mouth Daily., Disp: , Rfl:     Fremanezumab-vfrm (Ajovy) 225 MG/1.5ML solution auto-injector, Inject 225 mg under the skin into the appropriate area as directed Every 30 (Thirty) Days., Disp: 1.68 mL, Rfl: 2    MAGNESIUM PO, Take  by mouth. Magnesium oxide 400mg, Disp: , Rfl:     melatonin 1 MG tablet, Take  by mouth., Disp: , Rfl:     montelukast (SINGULAIR) 10 MG tablet, Take 1 tablet daily for allergies, Disp: 60 tablet, Rfl: 1    multivitamin with minerals tablet tablet, Take 1 tablet by mouth Daily., Disp: , Rfl:     norethindrone (Ortho Micronor) 0.35 MG tablet, Take 1 tablet by mouth Daily., Disp: 90 tablet, Rfl: 3    PROBIOTIC PRODUCT PO, qhs, Disp: , Rfl:     propranolol (INDERAL) 20 MG tablet, Take 1 tablet by mouth 2 (Two) Times a Day., Disp: 60 tablet, Rfl: 2    ubrogepant (Ubrelvy) 100 MG tablet, Take 1 tablet by mouth 1 (One) Time As Needed (for migraine headache). May repeat dose in two hours if needed. Max of 2 tablets in 24 hours., Disp: 10 tablet, Rfl: 2    VITAMIN D PO, Vitamin D  once daily, Disp: , Rfl:     Vonoprazan Fumarate (Voquezna) 10 MG tablet, Take 1 tablet by mouth Daily., Disp: 30 tablet, Rfl: 2     Allergies:  Patient has no known allergies.    Review of Systems   Constitutional:  Negative for chills and fever.   Gastrointestinal:  Negative for abdominal pain.   Genitourinary:  Negative for dysuria, menstrual problem, pelvic pain, vaginal bleeding and vaginal discharge.   All other systems reviewed and are negative.      /91   Pulse 82   Ht 165.1 cm (65\")   Wt 88.5 kg (195 lb)   LMP 08/18/2024   BMI 32.45 kg/m²     Physical Exam  Constitutional:       General: She is not in acute distress.     Appearance: She is well-developed and normal weight.   Genitourinary:      Vulva normal. "   Breasts:     Right: No inverted nipple, mass or nipple discharge.      Left: No inverted nipple, mass or nipple discharge.   HENT:      Head: Normocephalic and atraumatic.      Nose: Nose normal.   Eyes:      Conjunctiva/sclera: Conjunctivae normal.      Pupils: Pupils are equal, round, and reactive to light.   Neck:      Thyroid: No thyromegaly.   Cardiovascular:      Rate and Rhythm: Normal rate and regular rhythm.      Heart sounds: Normal heart sounds. No murmur heard.  Pulmonary:      Effort: Pulmonary effort is normal. No respiratory distress.      Breath sounds: Normal breath sounds.   Abdominal:      General: Abdomen is flat. There is no distension.      Palpations: Abdomen is soft.      Tenderness: There is no abdominal tenderness.   Musculoskeletal:         General: No deformity. Normal range of motion.      Cervical back: Normal range of motion and neck supple.      Right lower leg: No edema.      Left lower leg: No edema.   Neurological:      Mental Status: She is alert and oriented to person, place, and time.   Skin:     General: Skin is warm and dry.      Findings: No erythema.   Psychiatric:         Behavior: Behavior normal.         Thought Content: Thought content normal.         Judgment: Judgment normal.   Vitals reviewed. Exam conducted with a chaperone present.                Assessment     Diagnoses and all orders for this visit:    1. Breast cancer screening by mammogram (Primary)    1) GYN   Has done pap, needs repeat in one year for HPV +  Has done colonoscopy   Needs MMG, discussed ACS and USPTF recommendations   To screen today, expectations reviewed  CBE normal     2) on POP   Per neurology   Reviewed options and alternatives if not helpful but should be fine to continue       Elijah Reyes MD  8/27/2024  11:37 EDT

## 2024-08-30 ENCOUNTER — TELEPHONE (OUTPATIENT)
Dept: OBSTETRICS AND GYNECOLOGY | Facility: CLINIC | Age: 44
End: 2024-08-30
Payer: COMMERCIAL

## 2024-08-30 ENCOUNTER — PATIENT MESSAGE (OUTPATIENT)
Dept: OBSTETRICS AND GYNECOLOGY | Facility: CLINIC | Age: 44
End: 2024-08-30
Payer: COMMERCIAL

## 2024-08-30 ENCOUNTER — PATIENT ROUNDING (BHMG ONLY) (OUTPATIENT)
Dept: OBSTETRICS AND GYNECOLOGY | Facility: CLINIC | Age: 44
End: 2024-08-30
Payer: COMMERCIAL

## 2024-08-30 DIAGNOSIS — N63.20 MASS OF LEFT BREAST, UNSPECIFIED QUADRANT: ICD-10-CM

## 2024-08-30 DIAGNOSIS — N63.10 MASS OF RIGHT BREAST, UNSPECIFIED QUADRANT: Primary | ICD-10-CM

## 2024-08-30 NOTE — PROGRESS NOTES
My chart message has been sent to the patient for PATIENT ROUNDING with [facility].

## 2024-08-30 NOTE — PROGRESS NOTES
Kamryn, She is aware. Birads 0 - Limited ultrasound bilateral for mass on left and lymph node on right. Please help arrange. Thanks, Dr. Reyes

## 2024-08-30 NOTE — TELEPHONE ENCOUNTER
----- Message from Kamryn HAYNES sent at 8/30/2024  1:44 PM EDT -----  Results to pt and order placed for bilateral breast US. Pt given number to [facility] to call and schedule

## 2024-09-04 ENCOUNTER — APPOINTMENT (OUTPATIENT)
Dept: WOMENS IMAGING | Facility: HOSPITAL | Age: 44
End: 2024-09-04
Payer: COMMERCIAL

## 2024-09-04 PROCEDURE — 76642 ULTRASOUND BREAST LIMITED: CPT | Performed by: RADIOLOGY

## 2024-09-10 DIAGNOSIS — N63.10 MASS OF RIGHT BREAST, UNSPECIFIED QUADRANT: ICD-10-CM

## 2024-09-10 DIAGNOSIS — N63.20 MASS OF LEFT BREAST, UNSPECIFIED QUADRANT: ICD-10-CM

## 2024-09-11 NOTE — PROGRESS NOTES
Pelon Harry notes seen on ultrasound. Can return to screening mammogram per radiology. Please make sure she is aware. Thanks, Dr. Reyes

## 2024-09-12 DIAGNOSIS — G43.009 MIGRAINE WITHOUT AURA AND WITHOUT STATUS MIGRAINOSUS, NOT INTRACTABLE: ICD-10-CM

## 2024-09-13 ENCOUNTER — OFFICE VISIT (OUTPATIENT)
Dept: GASTROENTEROLOGY | Facility: CLINIC | Age: 44
End: 2024-09-13
Payer: COMMERCIAL

## 2024-09-13 VITALS
HEIGHT: 65 IN | WEIGHT: 196.4 LBS | DIASTOLIC BLOOD PRESSURE: 82 MMHG | SYSTOLIC BLOOD PRESSURE: 130 MMHG | BODY MASS INDEX: 32.72 KG/M2

## 2024-09-13 DIAGNOSIS — K58.1 IRRITABLE BOWEL SYNDROME WITH CONSTIPATION: ICD-10-CM

## 2024-09-13 DIAGNOSIS — K21.00 GASTROESOPHAGEAL REFLUX DISEASE WITH ESOPHAGITIS WITHOUT HEMORRHAGE: Primary | ICD-10-CM

## 2024-09-13 NOTE — PROGRESS NOTES
PATIENT INFORMATION  Giana Barraza       - 1980    CHIEF COMPLAINT  Chief Complaint   Patient presents with    Heartburn       HISTORY OF PRESENT ILLNESS  Here today for GERD     GERD: Started on Voquezna. Is doing better on Voquezna and fine on 10 mg, easier with taking once a day. Expensive at $50/ month. Reflux is directly related to fibro flare and stress and hormonal with new birth control. Avoiding acidic foods. Reviewed ok to use PRN. A few times with mild epigastric pain, not significant like before.    Headaches: Propanolol, elavil, just started ajovy and ubrelvy as rescue med.      Diagnosed with fibromyalgia.  2024 normal abd US     Per LOV: Bowels have slowed a little, hard to loose, bowels 6 of 7 days, not usually hard. Not taking fiber supplement. Was more complete with metamucil so will resume. TODAY: Back on metamucil daily because does not like dyes, reviewed benefiber daily, bowels are moving daily, more challenging with these meds, fiber focused, stools a little harder, not having diarrhea bouts, reviewed fluids and fluids, PRN SS or miralax if needed.    2024 EGD and Colon for dysphagia positive for mild chronic gastritis, reflux esophagitis, negative for HP or Barretts. Colon with no polyps. Family hx CRC.          REVIEWED PERTINENT RESULTS/ LABS  Lab Results   Component Value Date    CASEREPORT  2024     Surgical Pathology Report                         Case: [id number]                                  Authorizing Provider:  Avery Francois        Collected:           2024 10:54 AM                                 MD Marie                                                                   [facility]   Received:            2024 12:51 PM                                 OR                                                                           Pathologist:           Sarah Jean MD                                                           Specimens:   1) - Gastric, Antrum, Gastric biopsies                                                              2) - Esophagus, Distal, Biopsies                                                           FINALDX  04/29/2024     1. Stomach, antrum, biopsy:   A. Gastric antral mucosa with mild chronic inactive gastritis.   B. Negative for intestinal metaplasia.   C. Negative for H. pylori-type organisms on routine stain.    2. Esophagus, distal, biopsy:   A. Squamocolumnar mucosa with spongiosis, increased intraepithelial lymphocytes, and eosinophils        (up to 1/HPF), consistent with reflux esophagitis.   B. Negative for intestinal metaplasia.          Lab Results   Component Value Date    HGB 13.8 08/13/2024    MCV 92 08/13/2024     08/13/2024    ALT 20 08/13/2024    AST 20 08/13/2024    HGBA1C 5.4 08/13/2024    INR 1.0 10/19/2023    TRIG 180 (H) 08/13/2024      No results found.    REVIEW OF SYSTEMS  Review of Systems   Constitutional:  Negative for activity change, chills, fever and unexpected weight change.   HENT:  Negative for congestion.    Eyes:  Negative for visual disturbance.   Respiratory:  Negative for shortness of breath.    Cardiovascular:  Negative for chest pain and palpitations.   Gastrointestinal:  Positive for abdominal pain. Negative for blood in stool.        Reflux   Endocrine: Negative for cold intolerance and heat intolerance.   Genitourinary:  Negative for hematuria.   Musculoskeletal:  Negative for gait problem.   Skin:  Negative for color change.   Allergic/Immunologic: Negative for immunocompromised state.   Neurological:  Negative for weakness and light-headedness.   Hematological:  Negative for adenopathy.   Psychiatric/Behavioral:  Negative for sleep disturbance. The patient is not nervous/anxious.          ACTIVE PROBLEMS  Patient Active Problem List    Diagnosis     Irritable bowel syndrome with constipation [K58.1]     Gastroesophageal reflux  disease with esophagitis without hemorrhage [K21.00]     Papanicolaou smear of cervix with positive high risk human papilloma virus (HPV) test [R87.810]     Hyperlipidemia [E78.5]     Migraine without aura and without status migrainosus, not intractable [G43.009]     Bilateral occipital neuralgia [M54.81]     Esophageal dysphagia [R13.19]     Family history of colon cancer [Z80.0]     Class 1 obesity due to excess calories without serious comorbidity with body mass index (BMI) of 32.0 to 32.9 in adult [E66.09, Z68.32]     Heartburn [R12]     Seasonal allergic rhinitis [J30.2]     Fibromyalgia [M79.7]     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]     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]          PAST MEDICAL HISTORY  Past Medical History:   Diagnosis Date    Bilateral occipital neuralgia 06/24/2024    Chest wall discomfort 12/03/2020    Fibromyalgia, primary     GERD (gastroesophageal reflux disease) Aug 2021    Headache     Headache, tension-type Elijah    Hyperlipidemia 08/13/2024    Migraine Elijah    Palpitation 12/03/2020    Palpitations 12/03/2020    PMS (premenstrual syndrome)     Recurrent pregnancy loss, antepartum condition or complication     Varicella          SURGICAL HISTORY  Past Surgical History:   Procedure Laterality Date    COLONOSCOPY N/A 04/29/2024    Procedure: COLONOSCOPY;  Surgeon: Avery Francois MD;  Location: [facility];  Service: Gastroenterology;  Laterality: N/A;  Diverticulosis    D & C WITH SUCTION  11/2014    DILATATION AND CURETTAGE      ENDOSCOPY N/A 04/29/2024    Procedure: ESOPHAGOGASTRODUODENOSCOPY WITH BIOPSY;  Surgeon: Avery Francois MD;  Location: [facility];  Service: Gastroenterology;  Laterality: N/A;  Erosive esophagitis; Biopsies- gastric, distal esophagus    HERNIA REPAIR      Months after birth         FAMILY HISTORY  Family History   Problem Relation Age of Onset    No Known Problems Father     Colon cancer Mother     Heart disease Paternal Grandfather     Breast  cancer Neg Hx     Ovarian cancer Neg Hx     Uterine cancer Neg Hx     Deep vein thrombosis Neg Hx     Pulmonary embolism Neg Hx          SOCIAL HISTORY  Social History     Occupational History    Not on file   Tobacco Use    Smoking status: Never    Smokeless tobacco: Never   Vaping Use    Vaping status: Never Used   Substance and Sexual Activity    Alcohol use: Not Currently    Drug use: Never    Sexual activity: Yes     Partners: Male     Birth control/protection: Birth control pill         CURRENT MEDICATIONS    Current Outpatient Medications:     acetaminophen (TYLENOL) 325 MG tablet, Take 2 tablets by mouth Every 6 (Six) Hours As Needed for Mild Pain., Disp: , Rfl:     amitriptyline (ELAVIL) 25 MG tablet, Take 1 tablet by mouth Every Night., Disp: 90 tablet, Rfl: 0    cetirizine (zyrTEC) 10 MG tablet, Take 1 tablet by mouth Daily., Disp: , Rfl:     Fremanezumab-vfrm (Ajovy) 225 MG/1.5ML solution auto-injector, Inject 225 mg under the skin into the appropriate area as directed Every 30 (Thirty) Days., Disp: 1.68 mL, Rfl: 2    MAGNESIUM PO, Take  by mouth. Magnesium oxide 400mg, Disp: , Rfl:     melatonin 1 MG tablet, Take  by mouth., Disp: , Rfl:     multivitamin with minerals tablet tablet, Take 1 tablet by mouth Daily., Disp: , Rfl:     norethindrone (Ortho Micronor) 0.35 MG tablet, Take 1 tablet by mouth Daily., Disp: 90 tablet, Rfl: 3    PROBIOTIC PRODUCT PO, qhs, Disp: , Rfl:     propranolol (INDERAL) 20 MG tablet, Take 1 tablet by mouth 2 (Two) Times a Day., Disp: 60 tablet, Rfl: 2    ubrogepant (Ubrelvy) 100 MG tablet, Take 1 tablet by mouth 1 (One) Time As Needed (for migraine headache). May repeat dose in two hours if needed. Max of 2 tablets in 24 hours., Disp: 10 tablet, Rfl: 2    VITAMIN D PO, Vitamin D  once daily, Disp: , Rfl:     Vonoprazan Fumarate (Voquezna) 10 MG tablet, Take 1 tablet by mouth Daily., Disp: 30 tablet, Rfl: 2    ALLERGIES  Patient has no known allergies.    VITALS  Vitals:     "09/13/24 1046   BP: 130/82   BP Location: Left arm   Patient Position: Sitting   Cuff Size: Adult   Weight: 89.1 kg (196 lb 6.4 oz)   Height: 165.1 cm (65\")       PHYSICAL EXAM  Debilities/Disabilities Identified: None  Emotional Behavior: Appropriate  Wt Readings from Last 3 Encounters:   09/13/24 89.1 kg (196 lb 6.4 oz)   08/27/24 88.5 kg (195 lb)   08/19/24 88 kg (194 lb)     Ht Readings from Last 1 Encounters:   09/13/24 165.1 cm (65\")     Body mass index is 32.68 kg/m².  Physical Exam  Constitutional:       General: She is not in acute distress.     Appearance: Normal appearance. She is not ill-appearing.   HENT:      Head: Normocephalic and atraumatic.      Mouth/Throat:      Mouth: Mucous membranes are moist.      Pharynx: No posterior oropharyngeal erythema.   Eyes:      General: No scleral icterus.  Cardiovascular:      Rate and Rhythm: Normal rate and regular rhythm.      Heart sounds: Normal heart sounds.   Pulmonary:      Effort: Pulmonary effort is normal.      Breath sounds: Normal breath sounds.   Abdominal:      General: Abdomen is flat. Bowel sounds are normal. There is no distension.      Palpations: Abdomen is soft. There is no mass.      Tenderness: There is no abdominal tenderness in the right lower quadrant. There is no guarding or rebound. Negative signs include Rodgers's sign.      Hernia: No hernia is present.   Musculoskeletal:      Cervical back: Neck supple.   Skin:     General: Skin is warm.      Capillary Refill: Capillary refill takes less than 2 seconds.   Neurological:      General: No focal deficit present.      Mental Status: She is alert and oriented to person, place, and time.   Psychiatric:         Mood and Affect: Mood normal.         Behavior: Behavior normal.         Thought Content: Thought content normal.         Judgment: Judgment normal.       CLINICAL DATA REVIEWED   reviewed previous lab results and integrated with today's visit, reviewed notes from other physicians " and/or last GI encounter, reviewed previous endoscopy results and available photos, reviewed surgical pathology results from previous biopsies    ASSESSMENT  Diagnoses and all orders for this visit:    Gastroesophageal reflux disease with esophagitis without hemorrhage    Irritable bowel syndrome with constipation          PLAN    GERD: Continue 10 mg Voquezna Daily  IBS: Continue fiber, reviewed fluids, PRN SS or miralax if needed    Return in about 6 months (around 3/13/2025).    I have discussed the above plan with the patient.  They verbalize understanding and are in agreement with the plan.  They have been advised to contact the office for any questions, concerns, or changes related to their health.

## 2024-09-20 ENCOUNTER — TELEPHONE (OUTPATIENT)
Dept: NEUROLOGY | Facility: CLINIC | Age: 44
End: 2024-09-20
Payer: COMMERCIAL

## 2024-11-18 ENCOUNTER — OFFICE VISIT (OUTPATIENT)
Dept: NEUROLOGY | Facility: CLINIC | Age: 44
End: 2024-11-18
Payer: COMMERCIAL

## 2024-11-18 VITALS
HEIGHT: 65 IN | SYSTOLIC BLOOD PRESSURE: 132 MMHG | WEIGHT: 203 LBS | DIASTOLIC BLOOD PRESSURE: 84 MMHG | OXYGEN SATURATION: 99 % | HEART RATE: 97 BPM | BODY MASS INDEX: 33.82 KG/M2

## 2024-11-18 DIAGNOSIS — G43.009 MIGRAINE WITHOUT AURA AND WITHOUT STATUS MIGRAINOSUS, NOT INTRACTABLE: Primary | ICD-10-CM

## 2024-11-18 PROCEDURE — 99214 OFFICE O/P EST MOD 30 MIN: CPT | Performed by: PSYCHIATRY & NEUROLOGY

## 2024-11-18 RX ORDER — PROPRANOLOL HCL 20 MG
20 TABLET ORAL 2 TIMES DAILY
Qty: 60 TABLET | Refills: 2 | Status: SHIPPED | OUTPATIENT
Start: 2024-11-18 | End: 2024-11-20 | Stop reason: SDUPTHER

## 2024-11-18 RX ORDER — ATOGEPANT 30 MG/1
30 TABLET ORAL DAILY
Qty: 30 TABLET | Refills: 2 | Status: SHIPPED | OUTPATIENT
Start: 2024-11-18

## 2024-11-18 NOTE — PROGRESS NOTES
Chief Complaint  Migraine (Having trouble with Occipital Nerves - Having more nerve pain than migraines - Having site reaction to Ajovy )    Subjective          Giana Barraza presents to [facility]UR[facility] for   HISTORY OF PRESENT ILLNESS:    Giana Barraza is a 44 year old right handed woman who returns to neurology clinic for follow up evaluation and treatment of migraines and occipital neuralgia and injections site reaction to the Ajovy.  She reports a history of migraines starting in her late 20's.  It seemed to improve with changing her birth control until over the past 3-4 years since moving to [place], [place].  She tells me she has been diagnosed with fibromyalgia around 5 years ago after having her daughter.  She has headaches involving the back of her head and also her temples and front of her head which have increased since 3/2024 and has been more consistent.  The pain is typically dull and achy which she rates as 8/10 on pain scale 1-10 when most severe with associated light and sound sensitivity along with nausea without vomiting.  They can last up to 2-3 days in duration.  She is waking up with some discomfort every day.  She is currently getting 1 headache day per week and previously 2-3 headache days per week.  She had a brain MRI scan on 6/17/2024 which does not demonstrate any acute intracranial abnormalities with minimal white matter changes.  She denies any family history of migraines.  She does report having menstrual related migraines.  She has noticed that barometric pressure changes can also effect her as well.  She is currently on amitriptyline 25 mg at bedtime along with magnesium and Co-Q10 and melatonin.  She has tried sumatriptan which was not helpful.  She has tried Flexeril in the past.  She was also given Nurtec ODT and Ubrelvy samples which dulled her headaches and allows her to function.  She thinks that Aleve helps and she has tried Esgic which is ok but not  great.  She does think Tylenol also helps at times.  She has had some heart palpitations with the naratriptan so she stopped using this medicine.  She does report having significant tension in her muscles in her neck and shoulders.  She does report having some anxiety.  She is currently on birth control that is non-estrogen based.  Her  has had a vasectomy.  I started her on Qulipta which does help with the migraines but she has significant injection site irritation associated with the injection which is a good sized red discoloration which is raised.      Past Medical History:   Diagnosis Date    Bilateral occipital neuralgia 06/24/2024    Chest wall discomfort 12/03/2020    Fibromyalgia, primary     GERD (gastroesophageal reflux disease) Aug 2021    Headache     Headache, tension-type Elijah    Hyperlipidemia 08/13/2024    Migraine Elijah    Palpitation 12/03/2020    Palpitations 12/03/2020    PMS (premenstrual syndrome)     Recurrent pregnancy loss, antepartum condition or complication     Varicella         Family History   Problem Relation Age of Onset    No Known Problems Father     Colon cancer Mother     Heart disease Paternal Grandfather     Breast cancer Neg Hx     Ovarian cancer Neg Hx     Uterine cancer Neg Hx     Deep vein thrombosis Neg Hx     Pulmonary embolism Neg Hx         Social History     Socioeconomic History    Marital status:    Tobacco Use    Smoking status: Never    Smokeless tobacco: Never   Vaping Use    Vaping status: Never Used   Substance and Sexual Activity    Alcohol use: Not Currently    Drug use: Never    Sexual activity: Yes     Partners: Male     Birth control/protection: Birth control pill        I have reviewed and confirmed the accuracy of the ROS as documented by the MA/LPN/RN Enrique Ng MD   Review of Systems   Neurological:  Positive for headache. Negative for dizziness, tremors, seizures, syncope, facial asymmetry, speech difficulty, weakness, light-headedness,  "numbness, memory problem and confusion.   Psychiatric/Behavioral:  Negative for agitation, behavioral problems, decreased concentration, dysphoric mood, hallucinations, self-injury, sleep disturbance, suicidal ideas, negative for hyperactivity, depressed mood and stress. The patient is not nervous/anxious.         Objective   Vital Signs:   /84   Pulse 97   Ht 165.1 cm (65\")   Wt 92.1 kg (203 lb)   SpO2 99%   BMI 33.78 kg/m²       PHYSICAL EXAM:    General   Mental Status - Alert. General Appearance - Well developed, Well groomed, Oriented and Cooperative. Orientation - Oriented X3.       Head and Neck  Head - normocephalic, atraumatic with no lesions or palpable masses.  Neck    Global Assessment - supple.       Eye   Sclera/Conjunctiva - Bilateral - Normal.    ENMT  Mouth and Throat   Oral Cavity/Oropharynx: Oropharynx - the soft palate,uvula and tongue are normal in appearance.    Chest and Lung Exam   Chest - lung clear to auscultation bilaterally.    Cardiovascular   Cardiovascular examination reveals  - normal heart sounds, regular rate and rhythm.    Neurologic   Mental Status: Speech - Normal. Cognitive function - appropriate fund of knowledge. No impairment of attention, Impairment of concentration, impairment of long term memory or impairment of short term memory.  Cranial Nerves:   II Optic: Visual acuity - Left - Normal. Right - Normal. Visual fields - Normal (to confrontation).  III Oculomotor: Pupillary constriction - Left - Normal. Right - Normal.  VII Facial: - Normal Bilaterally.   IX Glossopharyngeal / X Vagus - Normal.  XI Accessory: Trapezius - Bilateral - Normal. Sternocleidomastoid - Bilateral - Normal.  XII Hypoglossal - Bilateral - Normal.  Eye Movements: - Normal Bilaterally.  Sensory:   Light Touch: Intact - Globally.  Motor:   Bulk and Contour: - Normal.  Tone: - Normal.  Tremor: Not present.  Strength: 5/5 normal muscle strength - All Muscles.   General Assessment of Reflexes: " - deep tendon reflexes are normal. Coordination - No Impairment of finger-to-nose or Impairment of rapid alternating movements. Gait - Normal.       Result Review :                 Assessment and Plan    Problem List Items Addressed This Visit       Migraine without aura and without status migrainosus, not intractable - Primary    Current Assessment & Plan     44 year old right handed woman with migraines and occipital neuralgia and injections site reaction and side effect to the Ajovy.  She reports a history of migraines starting in her late 20's.  It seemed to improve with changing her birth control until over the past 3-4 years since moving to [place], [place].  She tells me she has been diagnosed with fibromyalgia around 5 years ago after having her daughter.  She has headaches involving the back of her head and also her temples and front of her head which have increased since 3/2024 and has been more consistent.  The pain is typically dull and achy which she rates as 8/10 on pain scale 1-10 when most severe with associated light and sound sensitivity along with nausea without vomiting.  They can last up to 2-3 days in duration.  She is waking up with some discomfort every day.  She is currently getting 1 headache day per week and previously 2-3 headache days per week.  She had a brain MRI scan on 6/17/2024 which does not demonstrate any acute intracranial abnormalities with minimal white matter changes.  She denies any family history of migraines.  She does report having menstrual related migraines.  She has noticed that barometric pressure changes can also effect her as well.  She is currently on amitriptyline 25 mg at bedtime along with magnesium and Co-Q10 and melatonin.  She has tried sumatriptan which was not helpful.  She has tried Flexeril in the past.  She was also given Nurtec ODT and Ubrelvy samples which dulled her headaches and allows her to function.  She thinks that Aleve helps and she has tried  Esgic which is ok but not great.  She does think Tylenol also helps at times.  She has had some heart palpitations with the naratriptan so she stopped using this medicine.  She does report having significant tension in her muscles in her neck and shoulders.  She does report having some anxiety.  She is currently on birth control that is non-estrogen based.  Her  has had a vasectomy.  I started her on Qulipta which does help with the migraines but she has significant injection site irritation associated with the injection which is a good sized red discoloration which is raised.  I spoke with her with regards to this side effect and switching her to Qulipta for migraine prevention after discussing potential side effects and continue the Ubrelvy for acute treatment.  Discussed migraine triggers and lifestyle modifications and provided patient education information today.            Relevant Medications    propranolol (INDERAL) 20 MG tablet    ubrogepant (Ubrelvy) 100 MG tablet    amitriptyline (ELAVIL) 25 MG tablet    Atogepant (Qulipta) 30 MG tablet       Follow Up   Return in about 3 months (around 2/18/2025).  Patient was given instructions and counseling regarding her condition or for health maintenance advice. Please see specific information pulled into the AVS if appropriate.

## 2024-11-18 NOTE — ASSESSMENT & PLAN NOTE
44 year old right handed woman with migraines and occipital neuralgia and injections site reaction and side effect to the Ajovy.  She reports a history of migraines starting in her late 20's.  It seemed to improve with changing her birth control until over the past 3-4 years since moving to [place], [place].  She tells me she has been diagnosed with fibromyalgia around 5 years ago after having her daughter.  She has headaches involving the back of her head and also her temples and front of her head which have increased since 3/2024 and has been more consistent.  The pain is typically dull and achy which she rates as 8/10 on pain scale 1-10 when most severe with associated light and sound sensitivity along with nausea without vomiting.  They can last up to 2-3 days in duration.  She is waking up with some discomfort every day.  She is currently getting 1 headache day per week and previously 2-3 headache days per week.  She had a brain MRI scan on 6/17/2024 which does not demonstrate any acute intracranial abnormalities with minimal white matter changes.  She denies any family history of migraines.  She does report having menstrual related migraines.  She has noticed that barometric pressure changes can also effect her as well.  She is currently on amitriptyline 25 mg at bedtime along with magnesium and Co-Q10 and melatonin.  She has tried sumatriptan which was not helpful.  She has tried Flexeril in the past.  She was also given Nurtec ODT and Ubrelvy samples which dulled her headaches and allows her to function.  She thinks that Aleve helps and she has tried Esgic which is ok but not great.  She does think Tylenol also helps at times.  She has had some heart palpitations with the naratriptan so she stopped using this medicine.  She does report having significant tension in her muscles in her neck and shoulders.  She does report having some anxiety.  She is currently on birth control that is non-estrogen based.  Her   has had a vasectomy.  I started her on Qulipta which does help with the migraines but she has significant injection site irritation associated with the injection which is a good sized red discoloration which is raised.  I spoke with her with regards to this side effect and switching her to Qulipta for migraine prevention after discussing potential side effects and continue the Ubrelvy for acute treatment.  Discussed migraine triggers and lifestyle modifications and provided patient education information today.

## 2024-11-20 ENCOUNTER — SPECIALTY PHARMACY (OUTPATIENT)
Dept: NEUROLOGY | Facility: CLINIC | Age: 44
End: 2024-11-20
Payer: COMMERCIAL

## 2024-11-20 DIAGNOSIS — G43.009 MIGRAINE WITHOUT AURA AND WITHOUT STATUS MIGRAINOSUS, NOT INTRACTABLE: ICD-10-CM

## 2024-11-20 RX ORDER — PROPRANOLOL HCL 20 MG
20 TABLET ORAL 2 TIMES DAILY
Qty: 60 TABLET | Refills: 2 | Status: SHIPPED | OUTPATIENT
Start: 2024-11-20

## 2024-11-20 NOTE — PROGRESS NOTES
Specialty Pharmacy Patient Management Program  Neurology Initial Assessment     Giana Barraza is a 44 y.o. female with chronic migraine seen by a Neurology provider and enrolled in the Neurology Patient Management program offered by [facility].  An initial outreach was conducted, including assessment of therapy appropriateness and specialty medication education for atogepant (Qulipta). The patient was introduced to services offered by [facility], including: regular assessments, refill coordination, curbside pick-up or mail order delivery options, prior authorization maintenance, and financial assistance programs as applicable. The patient was also provided with contact information for the pharmacy team.     Insurance Coverage & Financial Support  Rx [employer] [facility] and copay card.    Relevant Past Medical History and Comorbidities  Relevant medical history and concomitant health conditions were discussed with the patient. The patient's chart has been reviewed for relevant past medical history and comorbid health conditions and updated as necessary.   Past Medical History:   Diagnosis Date    Bilateral occipital neuralgia 06/24/2024    Chest wall discomfort 12/03/2020    Fibromyalgia, primary     GERD (gastroesophageal reflux disease) Aug 2021    Headache     Headache, tension-type Elijah    Hyperlipidemia 08/13/2024    Migraine Elijah    Palpitation 12/03/2020    Palpitations 12/03/2020    PMS (premenstrual syndrome)     Recurrent pregnancy loss, antepartum condition or complication     Varicella      Social History     Socioeconomic History    Marital status:    Tobacco Use    Smoking status: Never    Smokeless tobacco: Never   Vaping Use    Vaping status: Never Used   Substance and Sexual Activity    Alcohol use: Not Currently    Drug use: Never    Sexual activity: Yes     Partners: Male     Birth control/protection: Birth control pill     Problem list  reviewed by Enrico Evans RPH on 11/20/2024 at  5:07 PM      Allergies  Known allergies and reactions were discussed with the patient. The patient's chart has been reviewed for  allergy information and updated as necessary.   No Known Allergies  Allergies reviewed by Enrico Evans RPH on 11/20/2024 at  5:07 PM      Relevant Laboratory Values  Common labs          4/1/2024    11:03 5/5/2024    12:51 8/13/2024    10:03   Common Labs   Glucose   91    BUN   12    Creatinine   0.84    Sodium   139    Potassium   4.4    Chloride   101    Calcium   9.7    Total Protein 7.7   7.3    Albumin 4.5   4.3    Total Bilirubin 0.3   0.3    Alkaline Phosphatase 55   62    AST (SGOT) 32   20    ALT (SGPT) 35   20    WBC  8.39     7.6    Hemoglobin  14.0     13.8    Hematocrit  42.5     42.5    Platelets  287     347    Total Cholesterol   243    Triglycerides   180    HDL Cholesterol   89    LDL Cholesterol    123    Hemoglobin A1C   5.4       Details          This result is from an external source.               Lab Assessment  The above labs have been reviewed. No dose adjustments are needed for the specialty medication(s) based on the labs.       Current Medication List  This medication list has been reviewed with the patient and evaluated for any interactions or necessary modifications/recommendations, and updated to include all prescription medications, OTC medications, and supplements the patient is currently taking.  This list reflects what is contained in the patient's profile, which has also been marked as reviewed to communicate to other providers it is the most up to date version of the patient's current medication therapy.     Current Outpatient Medications:     amitriptyline (ELAVIL) 25 MG tablet, Take 1 tablet by mouth Every Night., Disp: 90 tablet, Rfl: 0    propranolol (INDERAL) 20 MG tablet, Take 1 tablet by mouth 2 (Two) Times a Day., Disp: 60 tablet, Rfl: 2    acetaminophen (TYLENOL) 325 MG tablet, Take 2  tablets by mouth Every 6 (Six) Hours As Needed for Mild Pain., Disp: , Rfl:     Atogepant (Qulipta) 30 MG tablet, Take 1 tablet by mouth Daily., Disp: 30 tablet, Rfl: 2    cetirizine (zyrTEC) 10 MG tablet, Take 1 tablet by mouth Daily., Disp: , Rfl:     MAGNESIUM PO, Take  by mouth. Magnesium oxide 400mg, Disp: , Rfl:     melatonin 1 MG tablet, Take  by mouth., Disp: , Rfl:     multivitamin with minerals tablet tablet, Take 1 tablet by mouth Daily., Disp: , Rfl:     norethindrone (Ortho Micronor) 0.35 MG tablet, Take 1 tablet by mouth Daily., Disp: 90 tablet, Rfl: 3    PROBIOTIC PRODUCT PO, qhs, Disp: , Rfl:     ubrogepant (Ubrelvy) 100 MG tablet, Take 1 tablet by mouth 1 (One) Time As Needed (for migraine headache). May repeat dose in two hours if needed. Max of 2 tablets in 24 hours., Disp: 10 tablet, Rfl: 2    VITAMIN D PO, Vitamin D  once daily, Disp: , Rfl:     Vonoprazan Fumarate (Voquezna) 10 MG tablet, Take 1 tablet by mouth Daily., Disp: 30 tablet, Rfl: 2    Medicines reviewed by Enrico Evans [facility] on 11/20/2024 at  5:07 PM    Drug Interactions  None identified.       Initial Education Provided for Specialty Medication  The patient has been provided with the following education and any applicable administration techniques (i.e. self-injection) have been demonstrated for the therapies indicated. All questions and concerns have been addressed prior to the patient receiving the medication, and the patient has verbalized understanding of the education and any materials provided.  Additional patient education shall be provided and documented upon request by the patient, provider or payer.      Atogepant (Qulipta) 60 mg tablet, take 1 tablet by mouth daily       Medication Expectations   Why am I taking this medication? You are taking this medication for migraine prophylaxis.   What should I expect while on this medication? You should expect to see a decrease in the frequency and severity of your migraines.    How does the medication work? Atogepant is a small molecule that binds to calcitonin gene-related peptide (CGRP) and blocks its binding to the receptor decreasing the severity of migraines.   How long will I be on this medication for? The amount of time you will be on this medication will be determined by your doctor and your response to the medication.    How do I take this medication? Take as directed on your prescription label. Take one tablet daily with or without food.   What are some possible side effects? Potential side effects including, but not limited to nausea, constipation and fatigue. Patient verbalized understanding.   What happens if I miss a dose? If you miss a dose of this medicine, take it as soon as possible. However, if it is almost time for your next dose, skip the missed dose and go back to your regular dosing schedule. Do not double doses.          Medication Safety   What are things I should warn my doctor immediately about? Hypersensitivity reactions, such as trouble breathing or swallowing.   What are things that I should be cautious of? Be cautious driving until you know how this drug will affect you.   What are some medications that can interact with this one? Ketoconazole, Itraconazole, cyclosporin, clarithromycin, rifampin, carbamazepine, phenytion, Chyna's Wort, efavirenz, and etravine.           Medication Storage/Handling   How should I handle this medication? Keep this medication our of reach of pets/children in original container.   How does this medication need to be stored? Store at room temperature away from heat/cold, sunlight or moisture.   How should I dispose of this medication? There should not be a need to dispose of this medication unless your provider decides to change the dose or therapy. If that is the case, take to your local police station for proper disposal. Some pharmacies also have take-back bins for medication drop-off.            Resources/Support   How  can I remind myself to take this medication? You can download reminder apps to help you manage your refills. You may also set an alarm on your phone to remind you. The pharmacy carries pill boxes that you can place next to an area you pass everyday (such as where you place your car keys or where you charge your phone).   Is financial support available?  Yes, [employer] can provide co-pay cards if you have commercial insurance or patient assistance if you have Medicare or no insurance.    Which vaccines are recommended for me? Talk to your doctor about these vaccines: Flu, Coronavirus (COVID-19), Pneumococcal (pneumonia), Tdap, Hepatitis B, Zoster (shingles).          Ubrelvy (Ubrogepant) 100 mg tablet, take 1 tablet by mouth as needed for headache symptoms. May repeat 1 tablet in 2 hours for continued symptoms. Max of 2 tablets over 24 hours.   Medication Expectations   Why am I taking this medication? You are taking this medication to treat acute migraines.   What should I expect while on this medication? You should expect to see a decrease in the frequency and severity of your migraines.   How does the medication work? Ubrelvy is a small molecule that binds to calcitonin gene-related peptide (CGRP) and blocks its binding to the receptor decreasing the severity of migraines.   How long will I be on this medication for? The amount of time you will be on this medication will be determined by your doctor and your response to the medication.    How do I take this medication? Take as directed on your prescription label. Reviewed plan for ubrogepant 100 mg (1 tablet) PO daily PRN; may repeat 1 tablet in 2 hours, if needed. Max dose per day is 200 mg over 24 hours.    What are some possible side effects? Potential side effects including, but not limited to nausea and somnolence. Patient verbalized understanding.   What happens if I miss a dose? This is taken as needed for migraines.     Medication Safety   What are things I  should warn my doctor immediately about? Allergic reaction: Itching or hives, swelling in your face or hands, swelling or tingling in your mouth or throat, chest tightness, trouble breathing     What are things that I should be cautious of? Tell your doctor if you are pregnant or breastfeeding, or if you have kidney disease or liver disease.   What are some medications that can interact with this one? Concomitant use of strong CY inhibitors, check with your pharmacist or provider before starting any new medications, avoid grapefruit juice.     Medication Storage/Handling   How should I handle this medication? Keep this medication out of reach of pets/children.   How does this medication need to be stored? Store at a controlled room temperature between 20 and 25 degrees C (68 and 77 degrees F), with excursions permitted between 15 and 30 degrees C (59 and 86 degrees F) away from direct sunlight and moisture.   How should I dispose of this medication? There should not be a need to dispose of this medication unless your provider decides to change the dose or therapy. If that is the case, take to your local police station for proper disposal. Some pharmacies also have take-back bins for medication drop-off.      Resources/Support   How can I remind myself to take this medication? This is taken as needed for migraines.   Is financial support available?  Yes, [employer] can provide co-pay cards if you have commercial insurance or patient assistance if you have Medicare or no insurance.    Which vaccines are recommended for me? Talk to your doctor about these vaccines: Flu, Coronavirus (COVID-19), Pneumococcal (pneumonia), Tdap, Hepatitis B, Zoster (shingles)           Adherence and Self-Administration  Adherence related to the patient's specialty therapy was discussed with the patient. The Adherence segment of this outreach has been reviewed and updated.   Is there a concern with patient's ability to  self administer the medication correctly and without issue?: No  Were any potential barriers to adherence identified during the initial assessment or patient education?: No  Are there any concerns regarding the patient's understanding of the importance of medication adherence?: No  Methods for Supporting Patient Adherence and/or Self-Administration: no further support needed at this time.    Goals of Therapy  Goals related to the patient's specialty therapy were discussed with the patient. The Patient Goals segment of this outreach has been reviewed and updated.   Goals Addressed Today        Specialty Pharmacy General Goal      Reduce migraine frequency and severity. As of 11/20/24, patient reports having about 1 headache day a week while she was on fremanezumab but had severe injection site reactions with medication. She reports migraine severity as a 8/10 on a pain scale from 1-10 when most severe. Prior to fremanezumab, she reports about 2-3 headache days a week. Starting atogepant for prevention and continuing ubrogepant as of 11/20/24.                   Reassessment Plan & Follow-Up  Medication Therapy Changes: starting atogepant for prevention and continuing ubrogepant for acute treatment of migraines per patient's neurologist.  Related Plans, Therapy Recommendations, or Therapy Problems to Be Addressed: Nothing further to be addressed at this time.   Pharmacist to perform regular reassessments no more than (6) months from the previous assessment.  Care Coordinator to set up future refill outreaches, coordinate prescription delivery, and escalate clinical questions to pharmacist.   Welcome information and patient satisfaction survey to be sent by specialty pharmacy team with patient's initial fill.    Attestation  Therapeutic appropriateness: Appropriate   I attest the patient was actively involved in and has agreed to the above plan of care. If the prescribed therapy is at any point deemed not appropriate  based on the current or future assessments, a consultation will be initiated with the patient's specialty care provider to determine the best course of action. The revised plan of therapy will be documented along with any additional patient education provided. Discussed aforementioned material with patient by phone.    Enrico Evans, PharmD, [facility]  Clinic Specialty Pharmacist, Neurology  11/20/2024  17:10 EST

## 2024-11-22 DIAGNOSIS — K21.00 GASTROESOPHAGEAL REFLUX DISEASE WITH ESOPHAGITIS, UNSPECIFIED WHETHER HEMORRHAGE: ICD-10-CM

## 2024-11-25 RX ORDER — VONOPRAZAN FUMARATE 13.36 MG/1
10 TABLET ORAL DAILY
Qty: 30 TABLET | Refills: 5 | Status: SHIPPED | OUTPATIENT
Start: 2024-11-25

## 2024-11-26 ENCOUNTER — TELEPHONE (OUTPATIENT)
Dept: GASTROENTEROLOGY | Facility: CLINIC | Age: 44
End: 2024-11-26
Payer: COMMERCIAL

## 2024-11-26 NOTE — TELEPHONE ENCOUNTER
PA must be sent via fax to [employer]     BIN: [id number]  ROGER: [id number]  Grp: [id number]  ID:  [id number]

## 2024-11-27 NOTE — TELEPHONE ENCOUNTER
CMM KEY: [id number] PATIENT INFORMED ABOUT PRIOR AUTHORIZATION PROCESS.     Note in CMM not matching pt  RE-RAN BOTH INSURANCE IN CHART/ GIVEN BY PHARMACY: BOTH NOT MATCH    NEED TO CONTACT RADHA ENCISO

## 2024-12-13 ENCOUNTER — OFFICE VISIT (OUTPATIENT)
Dept: INTERNAL MEDICINE | Facility: CLINIC | Age: 44
End: 2024-12-13
Payer: COMMERCIAL

## 2024-12-13 VITALS
SYSTOLIC BLOOD PRESSURE: 122 MMHG | WEIGHT: 204.4 LBS | OXYGEN SATURATION: 98 % | DIASTOLIC BLOOD PRESSURE: 80 MMHG | TEMPERATURE: 97.6 F | HEART RATE: 93 BPM | BODY MASS INDEX: 34.01 KG/M2

## 2024-12-13 DIAGNOSIS — Z01.419 ROUTINE GYNECOLOGICAL EXAMINATION: ICD-10-CM

## 2024-12-13 DIAGNOSIS — R12 HEARTBURN: ICD-10-CM

## 2024-12-13 DIAGNOSIS — M79.7 FIBROMYALGIA: Primary | ICD-10-CM

## 2024-12-13 RX ORDER — FAMOTIDINE 40 MG/1
40 TABLET, FILM COATED ORAL DAILY PRN
Qty: 90 TABLET | Refills: 3 | Status: SHIPPED | OUTPATIENT
Start: 2024-12-13

## 2024-12-13 RX ORDER — GABAPENTIN 100 MG/1
100 CAPSULE ORAL 3 TIMES DAILY PRN
Qty: 90 CAPSULE | Refills: 1 | Status: SHIPPED | OUTPATIENT
Start: 2024-12-13

## 2024-12-13 NOTE — PROGRESS NOTES
Date of Encounter: 2024  Patient: Giana Barraza,  1980    Subjective   History of Presenting Illness  Chief complaint: Headaches, fibromyalgia    Patient presents for follow-up    Migraines, doing fair bit better on Ubrelvy with Qulipta.  Had injection related side effects with Ajovy.    GERD, despite dietary avoidance of triggers including spicy foods, tomato-based foods, and others, she is still having breakthrough heartburn despite vonoprazan.  She has never tried famotidine.    Fibromyalgia, worse this time of year which affects all of her other illnesses.  She has never tried pregabalin or gabapentin.    Did not have a good experience with gynecology, requesting alternative referral    The following portions of the patient's history were reviewed and updated as appropriate: allergies, current medications, past family history, past medical history, past social history, past surgical history and problem list.    Patient Active Problem List   Diagnosis    Fibromyalgia    Lipomyelomeningocele    Congenital sacral meningocele    Heartburn    Seasonal allergic rhinitis    Class 1 obesity due to excess calories without serious comorbidity with body mass index (BMI) of 32.0 to 32.9 in adult    Esophageal dysphagia    Family history of colon cancer    Migraine without aura and without status migrainosus, not intractable    Bilateral occipital neuralgia    Hyperlipidemia    Papanicolaou smear of cervix with positive high risk human papilloma virus (HPV) test    Irritable bowel syndrome with constipation    Gastroesophageal reflux disease with esophagitis without hemorrhage     Past Medical History:   Diagnosis Date    Bilateral occipital neuralgia 2024    Chest wall discomfort 2020    Fibromyalgia, primary     GERD (gastroesophageal reflux disease) Aug 2021    Headache     Headache, tension-type Elijah    Hyperlipidemia 2024    Migraine Elijah    Palpitation 2020    Palpitations  12/03/2020    PMS (premenstrual syndrome)     Recurrent pregnancy loss, antepartum condition or complication     Varicella      Past Surgical History:   Procedure Laterality Date    COLONOSCOPY N/A 04/29/2024    Procedure: COLONOSCOPY;  Surgeon: Avery Francois MD;  Location: [facility];  Service: Gastroenterology;  Laterality: N/A;  Diverticulosis    D & C WITH SUCTION  11/2014    DILATATION AND CURETTAGE      ENDOSCOPY N/A 04/29/2024    Procedure: ESOPHAGOGASTRODUODENOSCOPY WITH BIOPSY;  Surgeon: Avery Francois MD;  Location: [facility];  Service: Gastroenterology;  Laterality: N/A;  Erosive esophagitis; Biopsies- gastric, distal esophagus    HERNIA REPAIR      Months after birth     Family History   Problem Relation Age of Onset    No Known Problems Father     Colon cancer Mother     Heart disease Paternal Grandfather     Breast cancer Neg Hx     Ovarian cancer Neg Hx     Uterine cancer Neg Hx     Deep vein thrombosis Neg Hx     Pulmonary embolism Neg Hx        Current Outpatient Medications:     acetaminophen (TYLENOL) 325 MG tablet, Take 2 tablets by mouth Every 6 (Six) Hours As Needed for Mild Pain., Disp: , Rfl:     amitriptyline (ELAVIL) 25 MG tablet, Take 1 tablet by mouth Every Night., Disp: 90 tablet, Rfl: 0    Atogepant (Qulipta) 30 MG tablet, Take 1 tablet by mouth Daily., Disp: 30 tablet, Rfl: 2    cetirizine (zyrTEC) 10 MG tablet, Take 1 tablet by mouth Daily., Disp: , Rfl:     MAGNESIUM PO, Take  by mouth. Magnesium oxide 400mg, Disp: , Rfl:     melatonin 1 MG tablet, Take  by mouth., Disp: , Rfl:     multivitamin with minerals tablet tablet, Take 1 tablet by mouth Daily., Disp: , Rfl:     norethindrone (Ortho Micronor) 0.35 MG tablet, Take 1 tablet by mouth Daily., Disp: 90 tablet, Rfl: 3    PROBIOTIC PRODUCT PO, qhs, Disp: , Rfl:     propranolol (INDERAL) 20 MG tablet, Take 1 tablet by mouth 2 (Two) Times a Day., Disp: 60 tablet, Rfl: 2    ubrogepant (Ubrelvy) 100 MG tablet,  Take 1 tablet by mouth 1 (One) Time As Needed (for migraine headache). May repeat dose in two hours if needed. Max of 2 tablets in 24 hours., Disp: 10 tablet, Rfl: 2    VITAMIN D PO, Vitamin D  once daily, Disp: , Rfl:     Vonoprazan Fumarate (Voquezna) 10 MG tablet, Take 1 tablet by mouth Daily., Disp: 30 tablet, Rfl: 5    famotidine (PEPCID) 40 MG tablet, Take 1 tablet by mouth Daily As Needed for Heartburn., Disp: 90 tablet, Rfl: 3    gabapentin (NEURONTIN) 100 MG capsule, Take 1 capsule by mouth 3 (Three) Times a Day As Needed (fibromyalgia)., Disp: 90 capsule, Rfl: 1    Psyllium (METAMUCIL PO), Take  by mouth., Disp: , Rfl:   No Known Allergies  Social History     Tobacco Use    Smoking status: Never    Smokeless tobacco: Never   Vaping Use    Vaping status: Never Used   Substance Use Topics    Alcohol use: Not Currently    Drug use: Never          Objective   Physical Exam  Vitals:    12/13/24 0900   BP: 122/80   BP Location: Left arm   Patient Position: Sitting   Cuff Size: Large Adult   Pulse: 93   Temp: 97.6 °F (36.4 °C)   TempSrc: Infrared   SpO2: 98%   Weight: 92.7 kg (204 lb 6.4 oz)     Body mass index is 34.01 kg/m².     Assessment & Plan   Assessment and Plan  Pleasant 44-year-old female with fibromyalgia, GERD, migraines, asymptomatic congenital sacral meningocele, allergic rhinitis, who presents with the following:     Diagnoses and all orders for this visit:    1. Fibromyalgia (Primary): Discussed risks and benefits of gabapentin which may also help with her migraines, occipital neuralgia.  If not effective I do recommend trial of pregabalin.  -     gabapentin (NEURONTIN) 100 MG capsule; Take 1 capsule by mouth 3 (Three) Times a Day As Needed (fibromyalgia).  Dispense: 90 capsule; Refill: 1    2. Heartburn: Recommend famotidine as needed on top of vonoprazan  -     famotidine (PEPCID) 40 MG tablet; Take 1 tablet by mouth Daily As Needed for Heartburn.  Dispense: 90 tablet; Refill: 3    3. Routine  gynecological examination: Will refer her to alternative gynecologist due to poor experience with prior provider.  If unable to get appointment promptly we can arrange for mammography and Pap smear in our office  -     Ambulatory Referral to Gynecology    Carter Ellis MD  Family Medicine  O: [phone]    Disclaimer: Parts of this note were dictated by speech recognition. Minor errors in transcription may be present. Please call if questions.

## 2024-12-17 ENCOUNTER — SPECIALTY PHARMACY (OUTPATIENT)
Dept: NEUROLOGY | Facility: CLINIC | Age: 44
End: 2024-12-17
Payer: COMMERCIAL

## 2024-12-17 NOTE — PROGRESS NOTES
Specialty Pharmacy Refill Coordination Note     Giana is a 44 y.o. female contacted today regarding refills of her specialty medication(s).    Specialty medication(s) and dose(s) confirmed: Yes  Changes to medications: no  Changes to insurance: no      Refill Questions      Flowsheet Row Most Recent Value   Changes to allergies? No   Changes to medications? No   New conditions or infections since last clinic visit No   Unplanned office visit, urgent care, ED, or hospital admission in the last 4 weeks  No   How does patient/caregiver feel medication is working? Very good   Financial problems or insurance changes  No   Since the previous refill, were any specialty medication doses or scheduled injections missed or delayed?  No   Does this patient require a clinical escalation to a pharmacist? No            Delivery Questions      Flowsheet Row Most Recent Value   Delivery method [employer]   Delivery address verified with patient/caregiver? Yes   Delivery address Home   Number of medications in delivery 1   Medication(s) being filled and delivered Atogepant (Qulipta)   Doses left of specialty medications 6   Copay verified? Yes   Copay amount $0   Copay form of payment No copayment ($0)   Ship Date 12/18/24   Delivery Date 12/19/24   Signature Required No                 Follow-up: 3 weeks     Enrico Evans RPH  12/17/2024   14:55 EST

## 2024-12-19 ENCOUNTER — SPECIALTY PHARMACY (OUTPATIENT)
Dept: NEUROLOGY | Facility: CLINIC | Age: 44
End: 2024-12-19
Payer: COMMERCIAL

## 2024-12-19 NOTE — PROGRESS NOTES
Specialty Pharmacy Refill Coordination Note     Giana is a 44 y.o. female contacted today regarding refills of her specialty medication(s).    Specialty medication(s) and dose(s) confirmed: Yes  Changes to medications: no  Changes to insurance: no      Refill Questions      Flowsheet Row Most Recent Value   Changes to allergies? No   Changes to medications? No   New conditions or infections since last clinic visit No   Unplanned office visit, urgent care, ED, or hospital admission in the last 4 weeks  No   How does patient/caregiver feel medication is working? Very good   Financial problems or insurance changes  No   Since the previous refill, were any specialty medication doses or scheduled injections missed or delayed?  No   Does this patient require a clinical escalation to a pharmacist? No            Delivery Questions      Flowsheet Row Most Recent Value   Delivery method UPS   Delivery address verified with patient/caregiver? Yes   Delivery address Home   Number of medications in delivery 1   Medication(s) being filled and delivered Ubrogepant (UBRELVY)   Doses left of specialty medications 4-5   Copay verified? Yes   Copay amount $0   Copay form of payment No copayment ($0)   Ship Date 12/19/24   Delivery Date 12/20/24   Signature Required No                 Follow-up: 3 weeks     Enrico Evans RPH  12/19/2024   11:08 EST

## 2024-12-20 ENCOUNTER — TELEPHONE (OUTPATIENT)
Dept: GASTROENTEROLOGY | Facility: CLINIC | Age: 44
End: 2024-12-20
Payer: COMMERCIAL

## 2024-12-20 RX ORDER — OMEPRAZOLE 40 MG/1
40 CAPSULE, DELAYED RELEASE ORAL DAILY
Qty: 30 CAPSULE | Refills: 3 | Status: SHIPPED | OUTPATIENT
Start: 2024-12-20

## 2024-12-20 NOTE — TELEPHONE ENCOUNTER
Vonoprazan Fumarate (Voquezna) 10 MG tablet [[id number]] (Order [id number])     JS HAD ASKED PT TO CALL IF ANYTHING CHANGED WHILE TAKING THE MED    SHE SAID TO LET JS KNOW THAT SHE STARTED EXPERIENCING HEARTBURN, ACID SWALLOWING AGAIN  SHE DIDN'T KNOW IF THE MED HAD STOPPED WORKING   SHE SAW HER PCP AND TOLD HIM. HE PUT HER ON PEPCID  SHE IS CURRENTLY TAKING BOTH  SHE WANTED TO KNOW IF SHE NEEDED TO TAKE BOTH>?  THE ADDITION OF THE PEPCID HAS WORKED  SHE SAID IF THE PEPCID ALONE WOULD WORK, SHE WOULD RATHER DO THAT SINCE THE VOQUEZNA IS EXPENSIVE  SHE STATED HER DIET IS BEING MONITORED AND SHE'S NOT EATING ANYTHING SPICY OR TOMATO BASED  SHE STARTED THE FAMOTIDINE ON SATURDAY    CALL BACK [phone]

## 2025-01-15 ENCOUNTER — SPECIALTY PHARMACY (OUTPATIENT)
Dept: NEUROLOGY | Facility: CLINIC | Age: 45
End: 2025-01-15
Payer: COMMERCIAL

## 2025-01-15 NOTE — PROGRESS NOTES
Specialty Pharmacy Refill Coordination Note     Giana is a 44 y.o. female contacted today regarding refills of Qulipta specialty medication(s).    Reviewed and verified with patient:       Specialty medication(s) and dose(s) confirmed: yes    Refill Questions      Flowsheet Row Most Recent Value   Changes to allergies? No   Changes to medications? No   New conditions or infections since last clinic visit No   Unplanned office visit, urgent care, ED, or hospital admission in the last 4 weeks  No   How does patient/caregiver feel medication is working? Very good   Financial problems or insurance changes  No   Since the previous refill, were any specialty medication doses or scheduled injections missed or delayed?  No   Does this patient require a clinical escalation to a pharmacist? No            Delivery Questions      Flowsheet Row Most Recent Value   Delivery method UPS   Delivery address verified with patient/caregiver? Yes   Delivery address Home   Number of medications in delivery 1   Medication(s) being filled and delivered Atogepant (Qulipta)   Doses left of specialty medications 5   Copay verified? Yes   Copay amount $0   Ship Date 1/16   Delivery Date 1/17   Signature Required No                   Follow-up: 21 day(s)     Whitley Cool, Pharmacy Technician  Specialty Pharmacy Technician

## 2025-01-21 DIAGNOSIS — G43.009 MIGRAINE WITHOUT AURA AND WITHOUT STATUS MIGRAINOSUS, NOT INTRACTABLE: ICD-10-CM

## 2025-01-21 RX ORDER — ATOGEPANT 30 MG/1
30 TABLET ORAL DAILY
Qty: 90 TABLET | Refills: 0 | Status: SHIPPED | OUTPATIENT
Start: 2025-01-21

## 2025-01-21 NOTE — TELEPHONE ENCOUNTER
Caller: Barraza Giana    Relationship: Self    Best call back number: [phone]     Requested Prescriptions:   Requested Prescriptions     Pending Prescriptions Disp Refills    Atogepant (Qulipta) 30 MG tablet 30 tablet 2     Sig: Take 1 tablet by mouth Daily.        Pharmacy where request should be sent: [facility]/PHARMACY #[id number] - [place], [place] - [address] [phone] [facility] [phone]      Last office visit with prescribing clinician: 11/18/2024   Last telemedicine visit with prescribing clinician: Visit date not found   Next office visit with prescribing clinician: 2/24/2025     Additional details provided by patient: PATIENT STATED THAT INSURANCE WILL ONLY COVER A 90  DAY SUPPLY. PLEASE SEND NEW RX TO PHARMACY LISTED ABOVE.    Does the patient have less than a 3 day supply:  [] Yes  [x] No    Would you like a call back once the refill request has been completed: [] Yes [x] No    If the office needs to give you a call back, can they leave a voicemail: [] Yes [x] No    Kenney Walker Rep   01/21/25 12:01 EST

## 2025-02-13 ENCOUNTER — TELEPHONE (OUTPATIENT)
Dept: NEUROLOGY | Facility: CLINIC | Age: 45
End: 2025-02-13

## 2025-02-13 NOTE — TELEPHONE ENCOUNTER
Caller:  RONDA LIU    Relationship: PATIENT    Callback number:  [phone]   Is it ok to leave a message: [x] Yes [] No    Requested medication for samples:      QULIPTA 30 MG TABLET    How much medication does the patient currently have left:  NONE    Who will be picking up the samples: PATIENT    Do you need information about patient financial assistance for this medication: [] Yes [x] No    Additional details provided:  PT HAVING TROUBLE WITH INSURANCE COMPANY FILLING PRESCRIPTION FOR THIS MEDICATION.  SHE WILL HAVE A GAP OF ABOUT 12 DAYS WITHOUT MEDICATION AND IS ASKING FOR SAMPLES TO GET HER TO THE DATE THEY WILL APPROVE REFILL.

## 2025-02-25 ENCOUNTER — TELEPHONE (OUTPATIENT)
Dept: INTERNAL MEDICINE | Facility: CLINIC | Age: 45
End: 2025-02-25

## 2025-02-25 ENCOUNTER — TELEPHONE (OUTPATIENT)
Dept: NEUROLOGY | Facility: CLINIC | Age: 45
End: 2025-02-25

## 2025-02-25 NOTE — TELEPHONE ENCOUNTER
LM for pt we do not know when a new doctor will start at this time just that they are working hard to have someone here once Dr Ellis is gone.

## 2025-02-25 NOTE — TELEPHONE ENCOUNTER
Caller: Giana Barraza    Relationship: Self    Best call back number: [phone]    What is the best time to reach you: ANYTIME BEFORE 3 OR AFTER 4     Who are you requesting to speak with (clinical staff, provider,  specific staff member): DIANA    Do you know the name of the person who called: DIANA    What was the call regarding:EARLIER MSG    Is it okay if the provider responds through MyChart:     THANK YOU

## 2025-02-25 NOTE — TELEPHONE ENCOUNTER
Caller: Giana Barraza    Relationship: Self    Best call back number:     Giana Barraza (Self) [phone] (Mobile)       What was the call regarding: CAN YOU CALL PATIENT AND ADVISE WHEN A NEW PROVIDER WILL BE AVAILABLE WHEN DR ROSARIO LEAVES     SHE HAD SOME CONCERNS ABOUT THE ONBOARDING/OFFBOARDING PROCESS     Is it okay if the provider responds through MyChart: CALL

## 2025-02-25 NOTE — TELEPHONE ENCOUNTER
Left message for the patient to return our call regarding 's message.    I think she should try the gabapentin her PCP is prescribing to see if it helps, it is a nerve pain medicine and may help. Any type of illness can make headaches worse.  If symptoms do not improve would recommend evaluation in the ED to be sure something more serious like sinus infection causing problem is not going on.

## 2025-02-25 NOTE — TELEPHONE ENCOUNTER
Caller: Giana Barraza    Relationship to Patient: SELF    Phone Number:   Telephone Information:   Mobile [phone]         Reason for Call: PT STATES SHE HAS BEEN SICK SINCE 2-16-25 AND SHE STATES AROUND FRIDAY 2-21-25 SHE STARTED HAVING INFLAMED Occipital Nerves AND MORE ISSUES. SHE IS WONDERING IF THE SICKNESS (CONGESTION MAINLY) COULD CONTRIBUTE TO THE INCREASED OCCIPITAL NERVE ISSUES (HEAD AND NECK), SHE STATES SHE IS ALSO EXPERIENCING, LIGHT SENSITIVELY AND DIZZINESS AS WELL.    SHE STATES SHE HAS BEEN TAKING HER MIGRAINE MEDICATION BUT THE NERVE PAIN/DISCOMFORT HAS BEEN INCREASED AND STEADY SINCE FRIDAY AND NOTHING IS HELPING THAT PAIN/DISCOMFORT FOR THIS FLAIR UP     SHE STATES HER PCP HAD SUGGESTED HER TO TAKE GABAPENTIN FOR HER Fibromyalgia BUT SHE IS WONDERING IF THAT MEDICATION COULD HELP WITH HER OCCIPITAL NERVES AS WELL, SHE STATES SHE IS VERY HESITANT TO TAKE THE MEDICATION BUT WANTED TO GET THE NEUROLOGIST POINT OF VIEW.     PLEASE REVIEW AND ADVISE

## 2025-03-10 DIAGNOSIS — G43.009 MIGRAINE WITHOUT AURA AND WITHOUT STATUS MIGRAINOSUS, NOT INTRACTABLE: ICD-10-CM

## 2025-03-11 RX ORDER — PROPRANOLOL HCL 20 MG
20 TABLET ORAL 2 TIMES DAILY
Qty: 60 TABLET | Refills: 0 | Status: SHIPPED | OUTPATIENT
Start: 2025-03-11

## 2025-03-13 ENCOUNTER — OFFICE VISIT (OUTPATIENT)
Dept: GASTROENTEROLOGY | Facility: CLINIC | Age: 45
End: 2025-03-13
Payer: COMMERCIAL

## 2025-03-13 VITALS
SYSTOLIC BLOOD PRESSURE: 112 MMHG | DIASTOLIC BLOOD PRESSURE: 78 MMHG | HEIGHT: 65 IN | WEIGHT: 202 LBS | BODY MASS INDEX: 33.66 KG/M2

## 2025-03-13 DIAGNOSIS — R12 HEARTBURN: ICD-10-CM

## 2025-03-13 DIAGNOSIS — K21.00 GASTROESOPHAGEAL REFLUX DISEASE WITH ESOPHAGITIS WITHOUT HEMORRHAGE: Primary | ICD-10-CM

## 2025-03-13 DIAGNOSIS — K58.1 IRRITABLE BOWEL SYNDROME WITH CONSTIPATION: ICD-10-CM

## 2025-03-13 RX ORDER — OMEPRAZOLE 40 MG/1
40 CAPSULE, DELAYED RELEASE ORAL DAILY
Qty: 90 CAPSULE | Refills: 3 | Status: SHIPPED | OUTPATIENT
Start: 2025-03-13

## 2025-03-13 RX ORDER — FAMOTIDINE 40 MG/1
40 TABLET, FILM COATED ORAL NIGHTLY
Qty: 90 TABLET | Refills: 3 | Status: SHIPPED | OUTPATIENT
Start: 2025-03-13

## 2025-03-13 NOTE — PROGRESS NOTES
PATIENT INFORMATION  Giana Barraza       - 1980    CHIEF COMPLAINT  Chief Complaint   Patient presents with    Heartburn    Irritable Bowel Syndrome    Constipation       HISTORY OF PRESENT ILLNESS  Here today for GERD     GERD: Per LOV: Started on Voquezna. Is doing better on Voquezna and fine on 10 mg, easier with taking once a day. Expensive at $50/ month. Reflux is directly related to fibro flare and stress and hormonal with new birth control. Avoiding acidic foods. Reviewed ok to use PRN. A few times with mild epigastric pain, not significant like before.    TODAY: Had a flare around clement. Switched to omeprazole in AM and famotidine at night. Not too rigid with diet, but is aware and cautious, breakthrough maybe once a week and non severe, when fibro flares GI do, but that has been better recently. No more episodes of heart racing.     Headaches: Propanolol, elavil, quilipta, and with ubrelvy as rescue med. Doing better. Covid last month and it trigger her migraines.      Bowels: Maybe a little, but consistent with metamucil twice day. Bowels are moving every day and usually pretty easy and well controlled. Pizza can slow things down.  2024 normal abd US  2024 EGD and Colon for dysphagia positive for mild chronic gastritis, reflux esophagitis, negative for HP or Barretts. Colon with no polyps. Family hx CRC.          REVIEWED PERTINENT RESULTS/ LABS  Lab Results   Component Value Date    CASEREPORT  2024     Surgical Pathology Report                         Case: [id number]                                  Authorizing Provider:  Aveyr Francois        Collected:           2024 10:54 AM                                 MD Marie                                                                   Ordering Location:     [facility]   Received:            2024 12:51 PM                                 OR                                                                            Pathologist:           Sarah Jean MD                                                          Specimens:   1) - Gastric, Antrum, Gastric biopsies                                                              2) - Esophagus, Distal, Biopsies                                                           FINALDX  04/29/2024     1. Stomach, antrum, biopsy:   A. Gastric antral mucosa with mild chronic inactive gastritis.   B. Negative for intestinal metaplasia.   C. Negative for H. pylori-type organisms on routine stain.    2. Esophagus, distal, biopsy:   A. Squamocolumnar mucosa with spongiosis, increased intraepithelial lymphocytes, and eosinophils        (up to 1/HPF), consistent with reflux esophagitis.   B. Negative for intestinal metaplasia.          Lab Results   Component Value Date    HGB 13.8 08/13/2024    MCV 92 08/13/2024     08/13/2024    ALT 20 08/13/2024    AST 20 08/13/2024    HGBA1C 5.4 08/13/2024    INR 1.0 10/19/2023    TRIG 180 (H) 08/13/2024      No results found.    REVIEW OF SYSTEMS  Review of Systems      ACTIVE PROBLEMS  Patient Active Problem List    Diagnosis     Irritable bowel syndrome with constipation [K58.1]     Gastroesophageal reflux disease with esophagitis without hemorrhage [K21.00]     Papanicolaou smear of cervix with positive high risk human papilloma virus (HPV) test [R87.810]     Hyperlipidemia [E78.5]     Migraine without aura and without status migrainosus, not intractable [G43.009]     Bilateral occipital neuralgia [M54.81]     Esophageal dysphagia [R13.19]     Family history of colon cancer [Z80.0]     Class 1 obesity due to excess calories without serious comorbidity with body mass index (BMI) of 32.0 to 32.9 in adult [E66.811, E66.09, Z68.32]     Heartburn [R12]     Seasonal allergic rhinitis [J30.2]     Fibromyalgia [M79.7]     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]     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]          PAST MEDICAL HISTORY  Past Medical  History:   Diagnosis Date    Bilateral occipital neuralgia 06/24/2024    Chest wall discomfort 12/03/2020    Fibromyalgia, primary     GERD (gastroesophageal reflux disease) Aug 2021    Headache     Headache, tension-type Elijah    Hyperlipidemia 08/13/2024    Migraine Elijah    Palpitation 12/03/2020    Palpitations 12/03/2020    PMS (premenstrual syndrome)     Recurrent pregnancy loss, antepartum condition or complication     Varicella          SURGICAL HISTORY  Past Surgical History:   Procedure Laterality Date    COLONOSCOPY N/A 04/29/2024    Procedure: COLONOSCOPY;  Surgeon: Avery Francois MD;  Location: [facility];  Service: Gastroenterology;  Laterality: N/A;  Diverticulosis    D & C WITH SUCTION  11/2014    DILATATION AND CURETTAGE      ENDOSCOPY N/A 04/29/2024    Procedure: ESOPHAGOGASTRODUODENOSCOPY WITH BIOPSY;  Surgeon: Avery Francois MD;  Location: [facility];  Service: Gastroenterology;  Laterality: N/A;  Erosive esophagitis; Biopsies- gastric, distal esophagus    HERNIA REPAIR      Months after birth         FAMILY HISTORY  Family History   Problem Relation Age of Onset    No Known Problems Father     Colon cancer Mother     Heart disease Paternal Grandfather     Breast cancer Neg Hx     Ovarian cancer Neg Hx     Uterine cancer Neg Hx     Deep vein thrombosis Neg Hx     Pulmonary embolism Neg Hx          SOCIAL HISTORY  Social History     Occupational History    Not on file   Tobacco Use    Smoking status: Never    Smokeless tobacco: Never   Vaping Use    Vaping status: Never Used   Substance and Sexual Activity    Alcohol use: Not Currently    Drug use: Never    Sexual activity: Yes     Partners: Male     Birth control/protection: Birth control pill         CURRENT MEDICATIONS    Current Outpatient Medications:     acetaminophen (TYLENOL) 325 MG tablet, Take 2 tablets by mouth Every 6 (Six) Hours As Needed for Mild Pain., Disp: , Rfl:     amitriptyline (ELAVIL) 25 MG tablet, Take 1  "tablet by mouth Every Night., Disp: 90 tablet, Rfl: 0    Atogepant (Qulipta) 30 MG tablet, Take 1 tablet by mouth Daily., Disp: 90 tablet, Rfl: 0    cetirizine (zyrTEC) 10 MG tablet, Take 1 tablet by mouth Daily., Disp: , Rfl:     famotidine (PEPCID) 40 MG tablet, Take 1 tablet by mouth Every Night., Disp: 90 tablet, Rfl: 3    MAGNESIUM PO, Take  by mouth. Magnesium oxide 400mg, Disp: , Rfl:     melatonin 1 MG tablet, Take  by mouth., Disp: , Rfl:     multivitamin with minerals tablet tablet, Take 1 tablet by mouth Daily., Disp: , Rfl:     norethindrone (Ortho Micronor) 0.35 MG tablet, Take 1 tablet by mouth Daily., Disp: 90 tablet, Rfl: 3    omeprazole (priLOSEC) 40 MG capsule, Take 1 capsule by mouth Daily., Disp: 90 capsule, Rfl: 3    PROBIOTIC PRODUCT PO, Every Night., Disp: , Rfl:     propranolol (INDERAL) 20 MG tablet, Take 1 tablet by mouth 2 (Two) Times a Day., Disp: 60 tablet, Rfl: 0    Psyllium (METAMUCIL PO), Take  by mouth., Disp: , Rfl:     ubrogepant (Ubrelvy) 100 MG tablet, Take 1 tablet by mouth 1 (One) Time As Needed (for migraine headache). May repeat dose in two hours if needed. Max of 2 tablets in 24 hours., Disp: 10 tablet, Rfl: 2    VITAMIN D PO, Vitamin D  once daily, Disp: , Rfl:     gabapentin (NEURONTIN) 100 MG capsule, Take 1 capsule by mouth 3 (Three) Times a Day As Needed (fibromyalgia). (Patient not taking: Reported on 3/13/2025), Disp: 90 capsule, Rfl: 1    ALLERGIES  Patient has no known allergies.    VITALS  Vitals:    03/13/25 1005   BP: 112/78   BP Location: Left arm   Patient Position: Sitting   Cuff Size: Large Adult   Weight: 91.6 kg (202 lb)   Height: 165.1 cm (65\")       PHYSICAL EXAM  Debilities/Disabilities Identified: None  Emotional Behavior: Appropriate  Wt Readings from Last 3 Encounters:   03/13/25 91.6 kg (202 lb)   12/13/24 92.7 kg (204 lb 6.4 oz)   11/18/24 92.1 kg (203 lb)     Ht Readings from Last 1 Encounters:   03/13/25 165.1 cm (65\")     Body mass index is 33.61 " kg/m².  Physical Exam  Constitutional:       General: She is not in acute distress.     Appearance: Normal appearance. She is not ill-appearing.   HENT:      Head: Normocephalic and atraumatic.      Mouth/Throat:      Mouth: Mucous membranes are moist.      Pharynx: No posterior oropharyngeal erythema.   Eyes:      General: No scleral icterus.  Cardiovascular:      Rate and Rhythm: Normal rate and regular rhythm.      Heart sounds: Normal heart sounds.   Pulmonary:      Effort: Pulmonary effort is normal.      Breath sounds: Normal breath sounds.   Abdominal:      General: Abdomen is flat. Bowel sounds are normal. There is no distension.      Palpations: Abdomen is soft. There is no mass.      Tenderness: There is no abdominal tenderness. There is no guarding or rebound. Negative signs include Rodgers's sign.      Hernia: No hernia is present.   Musculoskeletal:      Cervical back: Neck supple.   Skin:     General: Skin is warm.      Capillary Refill: Capillary refill takes less than 2 seconds.   Neurological:      General: No focal deficit present.      Mental Status: She is alert and oriented to person, place, and time.   Psychiatric:         Mood and Affect: Mood normal.         Behavior: Behavior normal.         Thought Content: Thought content normal.         Judgment: Judgment normal.         CLINICAL DATA REVIEWED   reviewed previous lab results and integrated with today's visit, reviewed notes from other physicians and/or last GI encounter, reviewed previous endoscopy results and available photos, reviewed surgical pathology results from previous biopsies      ASSESSMENT  Diagnoses and all orders for this visit:    Gastroesophageal reflux disease with esophagitis without hemorrhage    Heartburn  -     famotidine (PEPCID) 40 MG tablet; Take 1 tablet by mouth Every Night.    Irritable bowel syndrome with constipation    Other orders  -     omeprazole (priLOSEC) 40 MG capsule; Take 1 capsule by mouth  Daily.          PLAN    GERD: Continue PPI in AM, H2RA at night  IBS-C: Continue fiber and good fluid intake    Return in about 1 year (around 3/13/2026).    I have discussed the above plan with the patient.  They verbalize understanding and are in agreement with the plan.  They have been advised to contact the office for any questions, concerns, or changes related to their health.

## 2025-03-18 ENCOUNTER — TELEPHONE (OUTPATIENT)
Dept: GASTROENTEROLOGY | Facility: CLINIC | Age: 45
End: 2025-03-18
Payer: COMMERCIAL

## 2025-03-18 NOTE — TELEPHONE ENCOUNTER
omeprazole (priLOSEC) 40 MG capsule [[id number]] (Order [id number])     [facility] TOLD HER IT WOULDN'T BE COVERED AS WRITTEN  SHE WOULD LIKE TO SPEAK TO SOMEONE  .STATED SHE DIDN'T THINK IT WAS ANY DIFFERENT THAN THE REFILL SHE REQUESTED FROM THEM IN DEC

## 2025-03-19 NOTE — TELEPHONE ENCOUNTER
I spoke with Eric @ [facility].  Omeprazole is no longer on their formulary on any tier.  I advised pt and suggested that she sign onto [employer] and get an ID#.  Advised this is not insurance, but it looks like she can get RX for $4-$20 depending on pharmacy.  She is going to contact [facility] with her ID# and will call me if we need to transmit her RX elsewhere.

## 2025-03-20 ENCOUNTER — TELEPHONE (OUTPATIENT)
Dept: NEUROLOGY | Facility: CLINIC | Age: 45
End: 2025-03-20
Payer: COMMERCIAL

## 2025-03-20 DIAGNOSIS — G43.009 MIGRAINE WITHOUT AURA AND WITHOUT STATUS MIGRAINOSUS, NOT INTRACTABLE: ICD-10-CM

## 2025-03-20 RX ORDER — ATOGEPANT 30 MG/1
30 TABLET ORAL DAILY
Qty: 90 TABLET | Refills: 0 | Status: SHIPPED | OUTPATIENT
Start: 2025-03-20

## 2025-03-20 NOTE — TELEPHONE ENCOUNTER
Provider: DR FREEDMAN    Caller: Giana Barraza    Relationship to Patient: Self    Pharmacy: [facility]/pharmacy #[id number] - [place], [place] - [address] [phone] [facility] [phone]       Phone Number: [phone] (home)       Reason for Call: THE PATIENT IS GETTING A 30 OF THE QULIPTA FILLED BUT THE PHARMACY STATED THEY NO LONGER HAVE THE 90 DAY SUPPLY. SHE NEEDS AN UPDATED SCRIPT FOR THE QULIPTA FOR A 90 DAY SUPPLY TO BE SENT TO THE PHARMACY SO SHE CAN HAVE THE INS BE UPDATED FOR COVERAGE.     PLEASE REVIEW  THANK YOU

## 2025-03-25 NOTE — TELEPHONE ENCOUNTER
SHE WOULD LIKE TO REQUEST THIS BE SENT TO [facility] IN [place] AT [facility] PLEASE  SAID THE GOOD RX IS CHEAPER AT [facility]

## 2025-03-26 DIAGNOSIS — K21.00 GASTROESOPHAGEAL REFLUX DISEASE WITH ESOPHAGITIS WITHOUT HEMORRHAGE: Primary | ICD-10-CM

## 2025-03-26 RX ORDER — OMEPRAZOLE 40 MG/1
40 CAPSULE, DELAYED RELEASE ORAL DAILY
Qty: 90 CAPSULE | Refills: 1 | Status: SHIPPED | OUTPATIENT
Start: 2025-03-26

## 2025-03-26 RX ORDER — OMEPRAZOLE 40 MG/1
40 CAPSULE, DELAYED RELEASE ORAL DAILY
Qty: 90 CAPSULE | Refills: 1 | OUTPATIENT
Start: 2025-03-26

## 2025-04-01 ENCOUNTER — TELEPHONE (OUTPATIENT)
Dept: NEUROLOGY | Facility: CLINIC | Age: 45
End: 2025-04-01
Payer: COMMERCIAL

## 2025-04-01 ENCOUNTER — OFFICE VISIT (OUTPATIENT)
Dept: NEUROLOGY | Facility: CLINIC | Age: 45
End: 2025-04-01
Payer: COMMERCIAL

## 2025-04-01 VITALS
SYSTOLIC BLOOD PRESSURE: 132 MMHG | BODY MASS INDEX: 33.99 KG/M2 | WEIGHT: 204 LBS | HEIGHT: 65 IN | HEART RATE: 84 BPM | OXYGEN SATURATION: 99 % | DIASTOLIC BLOOD PRESSURE: 84 MMHG

## 2025-04-01 DIAGNOSIS — G43.009 MIGRAINE WITHOUT AURA AND WITHOUT STATUS MIGRAINOSUS, NOT INTRACTABLE: ICD-10-CM

## 2025-04-01 DIAGNOSIS — M79.7 FIBROMYALGIA: ICD-10-CM

## 2025-04-01 DIAGNOSIS — M54.81 BILATERAL OCCIPITAL NEURALGIA: Primary | ICD-10-CM

## 2025-04-01 PROCEDURE — 99215 OFFICE O/P EST HI 40 MIN: CPT | Performed by: PSYCHIATRY & NEUROLOGY

## 2025-04-01 RX ORDER — PROPRANOLOL HCL 20 MG
20 TABLET ORAL 2 TIMES DAILY
Qty: 180 TABLET | Refills: 0 | Status: SHIPPED | OUTPATIENT
Start: 2025-04-01

## 2025-04-01 RX ORDER — ZONISAMIDE 100 MG/1
100 CAPSULE ORAL DAILY
Qty: 90 CAPSULE | Refills: 0 | Status: SHIPPED | OUTPATIENT
Start: 2025-04-01

## 2025-04-01 RX ORDER — ATOGEPANT 30 MG/1
30 TABLET ORAL DAILY
Qty: 90 TABLET | Refills: 0 | Status: SHIPPED | OUTPATIENT
Start: 2025-04-01

## 2025-04-01 NOTE — ASSESSMENT & PLAN NOTE
She is having more occipital neuralgia type pain.  Nerve blocks were helpful in the past.  She has tenderness to palpation over the bilateral GREATER and LESSER occipital nerve distribution.  I will look into bilateral GREATER and LESSER ONBs.  I will start her on zonisamide today for further assistance.

## 2025-04-01 NOTE — ASSESSMENT & PLAN NOTE
44 year old right handed woman with migraines and occipital neuralgia and injections site reaction to the Ajovy and doing better with the Qulipta.  She reports a history of migraines starting in her late 20's.  It seemed to improve with changing her birth control until over the past 4-5 years ago since moving to [place], [place].  She tells me she has been diagnosed with fibromyalgia around 5 years ago after having her daughter.  She has headaches involving the back of her head and also her temples and front of her head which have increased since 3/2024 and has been more consistent.  The pain is typically dull and achy which she rates as 8/10 on pain scale 1-10 when most severe with associated light and sound sensitivity along with nausea without vomiting.  They can last up to 2-3 days in duration.  She is waking up with some discomfort every day.  She is currently getting 1 headache day per week and previously 2-3 headache days per week.  She had a brain MRI scan on 6/17/2024 which does not demonstrate any acute intracranial abnormalities with minimal white matter changes.  She denies any family history of migraines.  She does report having menstrual related migraines.  She has noticed that barometric pressure changes can also effect her as well.  She is currently on amitriptyline 25 mg at bedtime along with magnesium and Co-Q10 and melatonin and she thinks this has helped with her sleep.  She has tried sumatriptan which was not helpful.  She has tried Flexeril in the past.  She was also given Nurtec ODT and Ubrelvy samples which dulled her headaches and allows her to function and uses the Ubrelvy as needed.  She thinks that Aleve helps and she has tried Esgic which is ok but not great.  She does think Tylenol also helps at times.  She has had some heart palpitations with the naratriptan so she stopped using this medicine.  She does report having significant tension in her muscles in her neck and shoulders.  She does  report having some anxiety.  She is currently on birth control that is non-estrogen based.  Her  has had a vasectomy.  I started her on Ajovy which did help with the migraines but she had significant injection site irritation associated with the injection which is a good sized red discoloration which is raised so we discontinued this medicine and started her on Qulipta for migraine prevention which has been helpful.  I will continue the Qulipta, propranolol and amitriptyline for migraines prevention, will start zonisamide daily for prevention with assistance for the neuralgia and also continue the Ubrelvy as needed.  She is on birth control and  had vasectomy so she will not be getting pregnant on these medications. Discussed migraine triggers and lifestyle modifications.

## 2025-04-01 NOTE — ASSESSMENT & PLAN NOTE
She does feel that her fibromyalgia is getting worse as well. I will refer her to rheumatology for further evaluation and treatment.

## 2025-04-01 NOTE — TELEPHONE ENCOUNTER
Called and LVM for pt to call back about scheduling ONB.  PA is not required for procedure.    Auth is not required via [employer].   Verified on 04/01. Ref #[id number]

## 2025-04-01 NOTE — PROGRESS NOTES
Chief Complaint  Migraines 2-3 per month and increased occipital neuralgia and fibromyalgia pain    Subjective          Giana Barraza presents to [facility] for   HISTORY OF PRESENT ILLNESS:    Giana Barraza is a 44 year old right handed woman who returns to neurology clinic for follow up evaluation and treatment of migraines and occipital neuralgia and injections site reaction to the Ajovy and doing better with the Qulipta.  She reports a history of migraines starting in her late 20's.  It seemed to improve with changing her birth control until over the past 4-5 years ago since moving to [place], [place].  She tells me she has been diagnosed with fibromyalgia around 5 years ago after having her daughter.  She has headaches involving the back of her head and also her temples and front of her head which have increased since 3/2024 and has been more consistent.  The pain is typically dull and achy which she rates as 8/10 on pain scale 1-10 when most severe with associated light and sound sensitivity along with nausea without vomiting.  They can last up to 2-3 days in duration.  She is waking up with some discomfort every day.  She is currently getting 1 headache day per week and previously 2-3 headache days per week.  She had a brain MRI scan on 6/17/2024 which does not demonstrate any acute intracranial abnormalities with minimal white matter changes.  She denies any family history of migraines.  She does report having menstrual related migraines.  She has noticed that barometric pressure changes can also effect her as well.  She is currently on amitriptyline 25 mg at bedtime along with magnesium and Co-Q10 and melatonin and she thinks this has helped with her sleep.  She has tried sumatriptan which was not helpful.  She has tried Flexeril in the past.  She was also given Nurtec ODT and Ubrelvy samples which dulled her headaches and allows her to function and uses the Ubrelvy as needed.   She thinks that Aleve helps and she has tried Esgic which is ok but not great.  She does think Tylenol also helps at times.  She has had some heart palpitations with the naratriptan so she stopped using this medicine.  She does report having significant tension in her muscles in her neck and shoulders.  She does report having some anxiety.  She is currently on birth control that is non-estrogen based.  Her  has had a vasectomy.  I started her on Ajovy which did help with the migraines but she had significant injection site irritation associated with the injection which is a good sized red discoloration which is raised so we discontinued this medicine and started her on Qulipta for migraine prevention which has been helpful.        Past Medical History:   Diagnosis Date    Bilateral occipital neuralgia 06/24/2024    Chest wall discomfort 12/03/2020    Fibromyalgia, primary     GERD (gastroesophageal reflux disease) Aug 2021    Headache     Headache, tension-type Elijah    Hyperlipidemia 08/13/2024    Migraine Elijah    Palpitation 12/03/2020    Palpitations 12/03/2020    PMS (premenstrual syndrome)     Recurrent pregnancy loss, antepartum condition or complication     Varicella         Family History   Problem Relation Age of Onset    No Known Problems Father     Colon cancer Mother     Heart disease Paternal Grandfather     Breast cancer Neg Hx     Ovarian cancer Neg Hx     Uterine cancer Neg Hx     Deep vein thrombosis Neg Hx     Pulmonary embolism Neg Hx         Social History     Socioeconomic History    Marital status:    Tobacco Use    Smoking status: Never    Smokeless tobacco: Never   Vaping Use    Vaping status: Never Used   Substance and Sexual Activity    Alcohol use: Not Currently    Drug use: Never    Sexual activity: Yes     Partners: Male     Birth control/protection: Birth control pill        I have reviewed and confirmed the accuracy of the ROS as documented by the MA/LPN/RN Enrique Ng  "MD   Review of Systems   Neurological:  Positive for headache. Negative for dizziness, tremors, seizures, syncope, facial asymmetry, speech difficulty, weakness, light-headedness, numbness, memory problem and confusion.   Psychiatric/Behavioral:  Negative for agitation, behavioral problems, decreased concentration, dysphoric mood, hallucinations, self-injury, sleep disturbance, suicidal ideas, negative for hyperactivity, depressed mood and stress. The patient is not nervous/anxious.         Objective   Vital Signs:   /84   Pulse 84   Ht 165.1 cm (65\")   Wt 92.5 kg (204 lb)   SpO2 99%   BMI 33.95 kg/m²       PHYSICAL EXAM:    General   Mental Status - Alert. General Appearance - Well developed, Well groomed, Oriented and Cooperative. Orientation - Oriented X3.       Head and Neck  Head - normocephalic, atraumatic with no lesions or palpable masses. Tenderness to palpation over the GREATER and LESSER occipital nerves bilaterally.   Neck    Global Assessment - supple.       Eye   Sclera/Conjunctiva - Bilateral - Normal.    ENMT  Mouth and Throat   Oral Cavity/Oropharynx: Oropharynx - the soft palate,uvula and tongue are normal in appearance.    Chest and Lung Exam   Chest - lung clear to auscultation bilaterally.    Cardiovascular   Cardiovascular examination reveals  - normal heart sounds, regular rate and rhythm.    Neurologic   Mental Status: Speech - Normal. Cognitive function - appropriate fund of knowledge. No impairment of attention, Impairment of concentration, impairment of long term memory or impairment of short term memory.  Cranial Nerves:   II Optic: Visual acuity - Left - Normal. Right - Normal. Visual fields - Normal (to confrontation).  III Oculomotor: Pupillary constriction - Left - Normal. Right - Normal.  VII Facial: - Normal Bilaterally.   IX Glossopharyngeal / X Vagus - Normal.  XI Accessory: Trapezius - Bilateral - Normal. Sternocleidomastoid - Bilateral - Normal.  XII Hypoglossal - " Bilateral - Normal.  Eye Movements: - Normal Bilaterally.  Sensory:   Light Touch: Intact - Globally.  Motor:   Bulk and Contour: - Normal.  Tone: - Normal.  Tremor: Not present.  Strength: 5/5 normal muscle strength - All Muscles.   General Assessment of Reflexes: - deep tendon reflexes are normal. Coordination - No Impairment of finger-to-nose or Impairment of rapid alternating movements. Gait - Normal.       Result Review :                 Assessment and Plan    Problem List Items Addressed This Visit       Fibromyalgia    Current Assessment & Plan   She does feel that her fibromyalgia is getting worse as well. I will refer her to rheumatology for further evaluation and treatment.           Relevant Orders    Ambulatory Referral to Rheumatology    Migraine without aura and without status migrainosus, not intractable    Current Assessment & Plan   44 year old right handed woman with migraines and occipital neuralgia and injections site reaction to the Ajovy and doing better with the Qulipta.  She reports a history of migraines starting in her late 20's.  It seemed to improve with changing her birth control until over the past 4-5 years ago since moving to [place], [place].  She tells me she has been diagnosed with fibromyalgia around 5 years ago after having her daughter.  She has headaches involving the back of her head and also her temples and front of her head which have increased since 3/2024 and has been more consistent.  The pain is typically dull and achy which she rates as 8/10 on pain scale 1-10 when most severe with associated light and sound sensitivity along with nausea without vomiting.  They can last up to 2-3 days in duration.  She is waking up with some discomfort every day.  She is currently getting 1 headache day per week and previously 2-3 headache days per week.  She had a brain MRI scan on 6/17/2024 which does not demonstrate any acute intracranial abnormalities with minimal white matter  changes.  She denies any family history of migraines.  She does report having menstrual related migraines.  She has noticed that barometric pressure changes can also effect her as well.  She is currently on amitriptyline 25 mg at bedtime along with magnesium and Co-Q10 and melatonin and she thinks this has helped with her sleep.  She has tried sumatriptan which was not helpful.  She has tried Flexeril in the past.  She was also given Nurtec ODT and Ubrelvy samples which dulled her headaches and allows her to function and uses the Ubrelvy as needed.  She thinks that Aleve helps and she has tried Esgic which is ok but not great.  She does think Tylenol also helps at times.  She has had some heart palpitations with the naratriptan so she stopped using this medicine.  She does report having significant tension in her muscles in her neck and shoulders.  She does report having some anxiety.  She is currently on birth control that is non-estrogen based.  Her  has had a vasectomy.  I started her on Ajovy which did help with the migraines but she had significant injection site irritation associated with the injection which is a good sized red discoloration which is raised so we discontinued this medicine and started her on Qulipta for migraine prevention which has been helpful.  I will continue the Qulipta, propranolol and amitriptyline for migraines prevention, will start zonisamide daily for prevention with assistance for the neuralgia and also continue the Ubrelvy as needed.  She is on birth control and  had vasectomy so she will not be getting pregnant on these medications. Discussed migraine triggers and lifestyle modifications.               Relevant Medications    propranolol (INDERAL) 20 MG tablet    amitriptyline (ELAVIL) 25 MG tablet    Atogepant (Qulipta) 30 MG tablet    ubrogepant (Ubrelvy) 100 MG tablet    zonisamide (ZONEGRAN) 100 MG capsule    Bilateral occipital neuralgia - Primary    Current  Assessment & Plan   She is having more occipital neuralgia type pain.  Nerve blocks were helpful in the past.  She has tenderness to palpation over the bilateral GREATER and LESSER occipital nerve distribution.  I will look into bilateral GREATER and LESSER ONBs.  I will start her on zonisamide today for further assistance.             Relevant Medications    propranolol (INDERAL) 20 MG tablet    amitriptyline (ELAVIL) 25 MG tablet    Atogepant (Qulipta) 30 MG tablet    ubrogepant (Ubrelvy) 100 MG tablet    zonisamide (ZONEGRAN) 100 MG capsule       I spent 42 minutes caring for Giana on this date of service. This time includes time spent by me in the following activities:preparing for the visit, reviewing tests, obtaining and/or reviewing a separately obtained history, performing a medically appropriate examination and/or evaluation , counseling and educating the patient/family/caregiver, ordering medications, tests, or procedures, documenting information in the medical record, and care coordination    Follow Up   Return in about 3 months (around 7/1/2025).  Patient was given instructions and counseling regarding her condition or for health maintenance advice. Please see specific information pulled into the AVS if appropriate.

## 2025-04-10 ENCOUNTER — TELEPHONE (OUTPATIENT)
Dept: NEUROLOGY | Facility: CLINIC | Age: 45
End: 2025-04-10

## 2025-04-10 NOTE — TELEPHONE ENCOUNTER
Caller: Giana Mcgraw call back number:   Telephone Information:   Mobile [phone]       Chief complaint: RETURNING RAÚL'S CALL FROM 4-1-25 TO [facility]     Type of visit: PROCEDURE - ONB    Requested date: SOON

## 2025-04-30 ENCOUNTER — PROCEDURE VISIT (OUTPATIENT)
Dept: NEUROLOGY | Facility: CLINIC | Age: 45
End: 2025-04-30
Payer: COMMERCIAL

## 2025-04-30 DIAGNOSIS — M54.81 BILATERAL OCCIPITAL NEURALGIA: Primary | ICD-10-CM

## 2025-04-30 RX ORDER — METHYLPREDNISOLONE ACETATE 40 MG/ML
40 INJECTION, SUSPENSION INTRA-ARTICULAR; INTRALESIONAL; INTRAMUSCULAR; SOFT TISSUE ONCE
Status: COMPLETED | OUTPATIENT
Start: 2025-04-30 | End: 2025-04-30

## 2025-04-30 RX ORDER — LIDOCAINE HYDROCHLORIDE 20 MG/ML
5 INJECTION, SOLUTION EPIDURAL; INFILTRATION; INTRACAUDAL; PERINEURAL ONCE
Status: COMPLETED | OUTPATIENT
Start: 2025-04-30 | End: 2025-04-30

## 2025-04-30 RX ADMIN — LIDOCAINE HYDROCHLORIDE 5 ML: 20 INJECTION, SOLUTION EPIDURAL; INFILTRATION; INTRACAUDAL; PERINEURAL at 10:04

## 2025-04-30 RX ADMIN — METHYLPREDNISOLONE ACETATE 40 MG: 40 INJECTION, SUSPENSION INTRA-ARTICULAR; INTRALESIONAL; INTRAMUSCULAR; SOFT TISSUE at 10:04

## 2025-04-30 NOTE — TELEPHONE ENCOUNTER
Caller: Giana Barraza    Relationship to patient: Self    Best call back number:[phone]    Patient is needing: PATIENT NEEDS TO SCHEDULED FOR A COVID TEST. AN ATTEMPT WAS MADE TO WARM TRANSFER.       PATIENT IS EXPERIENCING THE FOLLOWING SYMPTOMS: BODY ACHES, LOW GRADE TEMPERATURE   - /75 on presentation   - Home medications: losartan  - Permissive hypertension for now, goal 160s/100s.    - ctm

## 2025-04-30 NOTE — PROGRESS NOTES
A time out was performed to confirm I had the right patient and was completing the right procedure.     Occipital Nerve Block Procedure Note:    PROCEDURE IN DETAIL:  The patient was injected in the bilateral occipital nerves with 2% lidocaine, a total of 2.5 mL times two, and Depomedrol total of 0.5 mL or 20 mg times two after obtaining written consent. Sterile technique was used including sterile gloves and needles. Injection sites identified using known anatomical landmarks.  The area to be injected was prepped with sterilizing agent. The patient tolerated the procedure without any complications. She will be returning for injection as indicated and clinic for follow up as previously scheduled.     Bilateral GREATER ONBs

## 2025-05-20 DIAGNOSIS — Z30.9 ENCOUNTER FOR CONTRACEPTIVE MANAGEMENT, UNSPECIFIED TYPE: ICD-10-CM

## 2025-05-20 RX ORDER — ACETAMINOPHEN AND CODEINE PHOSPHATE 120; 12 MG/5ML; MG/5ML
1 SOLUTION ORAL DAILY
Qty: 90 TABLET | Refills: 0 | Status: SHIPPED | OUTPATIENT
Start: 2025-05-20

## 2025-05-20 NOTE — TELEPHONE ENCOUNTER
Caller: Giana Barraza    Relationship: Self    Best call back number: [phone]*    Requested Prescriptions:   Requested Prescriptions     Pending Prescriptions Disp Refills    norethindrone (Ortho Micronor) 0.35 MG tablet 90 tablet 3     Sig: Take 1 tablet by mouth Daily.        Pharmacy where request should be sent: [id number] - [place], [place] - [address] [phone] [facility] [phone]      Last office visit with prescribing clinician: 12/13/2024   Last telemedicine visit with prescribing clinician: Visit date not found   Next office visit with prescribing clinician: Visit date not found     Additional details provided by patient: PATIENT OUT OF MEDICATION    Does the patient have less than a 3 day supply:  [x] Yes  [] No    Would you like a call back once the refill request has been completed: [] Yes [x] No    If the office needs to give you a call back, can they leave a voicemail: [] Yes [x] No    Sandi Mariscal   05/20/25 11:59 EDT

## 2025-05-29 ENCOUNTER — OFFICE VISIT (OUTPATIENT)
Dept: FAMILY MEDICINE CLINIC | Facility: CLINIC | Age: 45
End: 2025-05-29
Payer: COMMERCIAL

## 2025-05-29 VITALS
HEIGHT: 65 IN | HEART RATE: 81 BPM | DIASTOLIC BLOOD PRESSURE: 90 MMHG | OXYGEN SATURATION: 97 % | BODY MASS INDEX: 34.09 KG/M2 | WEIGHT: 204.6 LBS | TEMPERATURE: 96.6 F | SYSTOLIC BLOOD PRESSURE: 120 MMHG

## 2025-05-29 DIAGNOSIS — Z00.00 HEALTHCARE MAINTENANCE: ICD-10-CM

## 2025-05-29 DIAGNOSIS — Z30.9 ENCOUNTER FOR CONTRACEPTIVE MANAGEMENT, UNSPECIFIED TYPE: ICD-10-CM

## 2025-05-29 DIAGNOSIS — E55.9 VITAMIN D DEFICIENCY: ICD-10-CM

## 2025-05-29 DIAGNOSIS — K21.00 GASTROESOPHAGEAL REFLUX DISEASE WITH ESOPHAGITIS WITHOUT HEMORRHAGE: ICD-10-CM

## 2025-05-29 DIAGNOSIS — M54.81 BILATERAL OCCIPITAL NEURALGIA: ICD-10-CM

## 2025-05-29 DIAGNOSIS — G43.109 MIGRAINE WITH AURA AND WITHOUT STATUS MIGRAINOSUS, NOT INTRACTABLE: Primary | ICD-10-CM

## 2025-05-29 DIAGNOSIS — M79.7 FIBROMYALGIA: ICD-10-CM

## 2025-05-29 DIAGNOSIS — E78.2 MIXED HYPERLIPIDEMIA: ICD-10-CM

## 2025-05-29 RX ORDER — ACETAMINOPHEN AND CODEINE PHOSPHATE 120; 12 MG/5ML; MG/5ML
1 SOLUTION ORAL DAILY
Qty: 84 TABLET | Refills: 3 | Status: SHIPPED | OUTPATIENT
Start: 2025-05-29

## 2025-05-29 NOTE — PROGRESS NOTES
Chief Complaint  Establish Care (Establishing care, no complaints. )    Subjective        Giana Barraza presents to [facility]  History of Present Illness  History of Present Illness    Patient here today to establish care.  She has no complaints or concerns to address today.  Previous PCP Dr. Ellis, who is moving onto another practice    #On progestin-only contraception  - She recently refilled her progesterone-only birth control, prescribed due to stroke risk with estrogen in migraine patients.  - She questions its necessity since her  had a vasectomy and is concerned about cancer risks.  - She has an OB/GYN appointment scheduled for 08/19    #HPV Positive pap  - pap 08/2024, neg IEL, +HPV but neg for high-risk strains.  Repeat Pap smear recommended in 1 year (due 08/2025)  - Has scheduled OB appointment 8/19    #Migraine without aura  #Fibromyalgia  - Migraines increased after her fibromyalgia diagnosis post-childbirth.  - She has constant migraines for the past year, occasionally with visual disturbances.  - Qulipta since 11/2024 has significantly improved her condition.  - She takes Ubrelvy as needed, amitriptyline, and propranolol at night for sleep.  - She discontinued Ajovy due to injection site reactions.    #Occipital Neuralgia  - Diagnosed with occipital neuralgia, she received her second occipital nerve block a month ago, which was less uncomfortable than the first.  - She has inconsistently taken zonisamide and has not taken gabapentin.    #GERD  - She had a heartburn flare-up in 12/2024  - Famotidine and omeprazole have been effective.  - She occasionally has mild symptoms during fibromyalgia flare-ups.  - EGD/colonoscopy done 04/2024 showed esophagitis, 1 diverticulum, otherwise nml    #HCM  - Last mammogram 08/2024  - last pap smear (above) 08/2024  - Last colonoscopy 04/2024 with 7-year recall    #Cardiac Symptoms  - She went to the ER in 10/2023 for  "tachycardia and chest pain.  - EKG, echocardiogram, stress test, and Holter monitor were normal.  - She occasionally has palpitations, possibly related to fibromyalgia.    FAMILY HISTORY  Her aunt developed breast cancer a few years ago.      Objective   Vital Signs:  /90   Pulse 81   Temp 96.6 °F (35.9 °C)   Ht 165.1 cm (65\")   Wt 92.8 kg (204 lb 9.6 oz)   SpO2 97%   BMI 34.05 kg/m²   Estimated body mass index is 34.05 kg/m² as calculated from the following:    Height as of this encounter: 165.1 cm (65\").    Weight as of this encounter: 92.8 kg (204 lb 9.6 oz).            Physical Exam  Constitutional:       General: She is not in acute distress.     Appearance: Normal appearance. She is not ill-appearing.   HENT:      Head: Normocephalic and atraumatic.   Eyes:      Extraocular Movements: Extraocular movements intact.   Cardiovascular:      Rate and Rhythm: Normal rate and regular rhythm.      Heart sounds: Normal heart sounds. No murmur heard.  Pulmonary:      Effort: Pulmonary effort is normal.   Neurological:      Mental Status: She is alert.        Physical Exam      Result Review :         Results             Assessment and Plan   Diagnoses and all orders for this visit:    1. Migraine with aura and without status migrainosus, not intractable (Primary)  - Increased frequency post-fibromyalgia diagnosis; constant migraines for the past year  - Occasionally experiences visual disturbances; Qulipta since 11/2024 has significantly improved her condition  - Takes Ubrelvy as needed, amitriptyline, and propranolol at night for sleep  - Established with neurology, last seen 4/1.  Next neurology appointment 7/8    -     CBC & Differential; Future  -     Comprehensive Metabolic Panel; Future  -     Ferritin; Future  -     Iron Profile w/o Ferritin; Future    2. Bilateral occipital neuralgia  - Diagnosed with occipital neuralgia; received second occipital nerve block a month ago  - First treatment was " uncomfortable; second was less so, without fainting  - Inconsistent with zonisamide that was prescribed by neurology on 4/1; has not taken gabapentin  - Neurology follow-up on 7/8    3. Fibromyalgia  - not taking Gabapentin, denies need for other fibro pharmacotherapy currently    -     Urinalysis With Microscopic If Indicated (No Culture) - Urine, Clean Catch; Future    4. Gastroesophageal reflux disease with esophagitis without hemorrhage  - Occasionally mild symptoms during fibromyalgia flare-ups  - Reports sx controlled on Omeprazole 40 mg in the morning and famotidine 40 mg at night    -     CBC & Differential; Future  -     Comprehensive Metabolic Panel; Future    5. Mixed hyperlipidemia  -Last ASCVD score 0.4%, repeat lipid panel prior to annual physical 08/2025    -     TSH Rfx On Abnormal To Free T4; Future  -     Lipid Panel; Future    6. Encounter for contraceptive management, unspecified type  - On progesterone-only birth control for 6 months due to stroke risk with estrogen in migraine patients  - Questions necessity due to 's vasectomy  - Prescription for birth control will be sent to the pharmacy, but pt advised should be safe to discontinue from a contraception standpoint.  She can further discuss with her OB at upcoming appointment 08/2025    -     norethindrone (Ortho Micronor) 0.35 MG tablet; Take 1 tablet by mouth Daily.  Dispense: 84 tablet; Refill: 3    7. Healthcare maintenance  - Next Pap smear due 08/2025, has OB appointment scheduled 8/19  - Next mammogram due 08/2025, also has this appointment scheduled with upcoming OB  - Next screening colonoscopy due 04/2031    8. Vitamin D deficiency  -     Vitamin D,25-Hydroxy; Future    Assessment & Plan             Follow Up   Return in about 3 months (around 8/29/2025) for Annual Physical or sooner as needed.  Patient was given instructions and counseling regarding her condition or for health maintenance advice. Please see specific  information pulled into the AVS if appropriate.     Patient or patient representative verbalized consent for the use of Ambient Listening during the visit with  Erinn Gupta MD for chart documentation. 6/10/2025  11:20 EDT

## 2025-07-08 ENCOUNTER — OFFICE VISIT (OUTPATIENT)
Dept: NEUROLOGY | Facility: CLINIC | Age: 45
End: 2025-07-08
Payer: COMMERCIAL

## 2025-07-08 VITALS
SYSTOLIC BLOOD PRESSURE: 128 MMHG | HEIGHT: 65 IN | BODY MASS INDEX: 34.16 KG/M2 | HEART RATE: 86 BPM | DIASTOLIC BLOOD PRESSURE: 84 MMHG | OXYGEN SATURATION: 99 % | WEIGHT: 205 LBS

## 2025-07-08 DIAGNOSIS — G43.109 MIGRAINE WITH AURA AND WITHOUT STATUS MIGRAINOSUS, NOT INTRACTABLE: Primary | ICD-10-CM

## 2025-07-08 DIAGNOSIS — M79.7 FIBROMYALGIA: ICD-10-CM

## 2025-07-08 DIAGNOSIS — M54.81 BILATERAL OCCIPITAL NEURALGIA: ICD-10-CM

## 2025-07-08 PROBLEM — G43.009 MIGRAINE WITHOUT AURA AND WITHOUT STATUS MIGRAINOSUS, NOT INTRACTABLE: Status: RESOLVED | Noted: 2024-06-24 | Resolved: 2025-07-08

## 2025-07-08 PROCEDURE — 99214 OFFICE O/P EST MOD 30 MIN: CPT | Performed by: PSYCHIATRY & NEUROLOGY

## 2025-07-08 RX ORDER — ATOGEPANT 30 MG/1
30 TABLET ORAL DAILY
Qty: 90 TABLET | Refills: 0 | Status: SHIPPED | OUTPATIENT
Start: 2025-07-08

## 2025-07-08 RX ORDER — PROPRANOLOL HCL 20 MG
20 TABLET ORAL 2 TIMES DAILY
Qty: 180 TABLET | Refills: 0 | Status: SHIPPED | OUTPATIENT
Start: 2025-07-08

## 2025-07-08 NOTE — ASSESSMENT & PLAN NOTE
I also performed bilateral GREATER ONBs in 4/2025 which has helped with occipital neuralgia symptoms.  We can consider repeating the ONBs in the future as well if needed, she is doing well at this time.

## 2025-07-08 NOTE — ASSESSMENT & PLAN NOTE
45 year old right handed woman with migraines and occipital neuralgia and injections site reaction to the Ajovy and doing better with the Qulipta, amitriptyline and propranolol combination for prevention and Ubrelvy for acute treatment and has had 2 migraines for which she used Ubrelvy.  She reports a history of migraines starting in her late 20's.  It seemed to improve with changing her birth control over the past several years since moving to [place], [place].  She tells me she has been diagnosed with fibromyalgia around 6+ years ago after having her daughter.  She has headaches involving the back of her head and also her temples and front of her head which have increased since 3/2024 and has been more consistent.  The pain is typically dull and achy which she rates as 8/10 on pain scale 1-10 when most severe with associated light and sound sensitivity along with nausea without vomiting.  They can last up to 2-3 days in duration.  She is waking up with some discomfort every day.  She is currently getting 2 headache days per month and previously 2-3 headache days per week.  She had a brain MRI scan on 6/17/2024 which does not demonstrate any acute intracranial abnormalities with minimal white matter changes.  She tells me today that her mother had migraines.  She does report having menstrual related migraines.  She has noticed that barometric pressure changes can also effect her as well.  She is currently on amitriptyline 25 mg at bedtime along with magnesium and Co-Q10 and melatonin and she thinks this has helped with her sleep.  She has tried sumatriptan which was not helpful.  She has tried Flexeril in the past which helped with sleep.  She was also given Nurtec ODT and Ubrelvy samples which dulled her headaches and allows her to function and uses the Ubrelvy as needed.  She thinks that Aleve helps and she has tried Esgic which is ok but not great.  She does think Tylenol also helps at times.  She has had some  heart palpitations with the naratriptan so she stopped using this medicine.  She does report having significant tension in her muscles in her neck and shoulders.  She does report having some anxiety.  She is currently on birth control that is non-estrogen based.  Her  has had a vasectomy.  I started her on Ajovy which did help with the migraines but she had significant injection site irritation associated with the injection which is a good sized red discoloration which is raised so we discontinued this medicine and started her on Qulipta for migraine prevention which has been helpful and Ubrelvy also helps acutely.  She is doing well at this time and I will continue current medications as prescribed.  Discussed migraine triggers and lifestyle modifications.

## 2025-07-08 NOTE — PROGRESS NOTES
Chief Complaint  Migraine (Qulipta and ubrelvy and ONB- has had migraines coming on, but they are short lived and tolerable. Has used maybe 2 ubrelvy in 30days--pt reports trying zonsiamide for maybe a week, but feels it didn't do much for her and was doing just fine on qulipta and ubrelvy)    Subjective          Giana Barraza presents to [facility] for   HISTORY OF PRESENT ILLNESS:    Giana Barraza is a 45 year old right handed woman who returns to neurology clinic for follow up evaluation and treatment of migraines and occipital neuralgia and injections site reaction to the Ajovy and doing better with the Qulipta for prevention and Ubrelvy for acute treatment and has had 2 migraines for which she used Ubrelvy.  She reports a history of migraines starting in her late 20's.  It seemed to improve with changing her birth control over the past several years since moving to [place], [place].  She tells me she has been diagnosed with fibromyalgia around 6+ years ago after having her daughter.  She has headaches involving the back of her head and also her temples and front of her head which have increased since 3/2024 and has been more consistent.  The pain is typically dull and achy which she rates as 8/10 on pain scale 1-10 when most severe with associated light and sound sensitivity along with nausea without vomiting.  They can last up to 2-3 days in duration.  She is waking up with some discomfort every day.  She is currently getting 2 headache days per month and previously 2-3 headache days per week.  She had a brain MRI scan on 6/17/2024 which does not demonstrate any acute intracranial abnormalities with minimal white matter changes.  She tells me today that her mother had migraines.  She does report having menstrual related migraines.  She has noticed that barometric pressure changes can also effect her as well.  She is currently on amitriptyline 25 mg at bedtime along with magnesium  and Co-Q10 and melatonin and she thinks this has helped with her sleep.  She has tried sumatriptan which was not helpful.  She has tried Flexeril in the past which helped with sleep.  She was also given Nurtec ODT and Ubrelvy samples which dulled her headaches and allows her to function and uses the Ubrelvy as needed.  She thinks that Aleve helps and she has tried Esgic which is ok but not great.  She does think Tylenol also helps at times.  She has had some heart palpitations with the naratriptan so she stopped using this medicine.  She does report having significant tension in her muscles in her neck and shoulders.  She does report having some anxiety.  She is currently on birth control that is non-estrogen based.  Her  has had a vasectomy.  I started her on Ajovy which did help with the migraines but she had significant injection site irritation associated with the injection which is a good sized red discoloration which is raised so we discontinued this medicine and started her on Qulipta for migraine prevention which has been helpful and Ubrelvy also helps acutely.  I started her on zonisamide which she did not continue as she is doing well.   I also performed bilateral GREATER ONBs in 4/2025 which has helped with occipital neuralgia symptoms.      Past Medical History:   Diagnosis Date    Bilateral occipital neuralgia 06/24/2024    Chest wall discomfort 12/03/2020    Fibromyalgia, primary     GERD (gastroesophageal reflux disease) Aug 2021    Headache     Headache, tension-type Elijah    Hyperlipidemia 08/13/2024    Migraine Elijah    Palpitation 12/03/2020    Palpitations 12/03/2020    PMS (premenstrual syndrome)     Recurrent pregnancy loss, antepartum condition or complication     Varicella         Family History   Problem Relation Age of Onset    No Known Problems Father     Colon cancer Mother     Heart disease Paternal Grandfather     Breast cancer Neg Hx     Ovarian cancer Neg Hx     Uterine cancer Neg  "Hx     Deep vein thrombosis Neg Hx     Pulmonary embolism Neg Hx         Social History     Socioeconomic History    Marital status:    Tobacco Use    Smoking status: Never    Smokeless tobacco: Never   Vaping Use    Vaping status: Never Used   Substance and Sexual Activity    Alcohol use: Not Currently    Drug use: Never    Sexual activity: Yes     Partners: Male     Birth control/protection: Birth control pill        I have reviewed and confirmed the accuracy of the ROS as documented by the MA/LPN/RN Enrique Ng MD   Review of Systems   Neurological:  Positive for headache. Negative for dizziness, tremors, seizures, syncope, facial asymmetry, speech difficulty, weakness, light-headedness, numbness, memory problem and confusion.   Psychiatric/Behavioral:  Negative for agitation, behavioral problems, decreased concentration, dysphoric mood, hallucinations, self-injury, sleep disturbance, suicidal ideas, negative for hyperactivity, depressed mood and stress. The patient is not nervous/anxious.         Objective   Vital Signs:   /84   Pulse 86   Ht 165.1 cm (65\")   Wt 93 kg (205 lb)   SpO2 99%   BMI 34.11 kg/m²       PHYSICAL EXAM:    General   Mental Status - Alert. General Appearance - Well developed, Well groomed, Oriented and Cooperative. Orientation - Oriented X3.       Head and Neck  Head - normocephalic, atraumatic with no lesions or palpable masses.  Neck    Global Assessment - supple.       Eye   Sclera/Conjunctiva - Bilateral - Normal.    ENMT  Mouth and Throat   Oral Cavity/Oropharynx: Oropharynx - the soft palate,uvula and tongue are normal in appearance.    Chest and Lung Exam   Chest - lung clear to auscultation bilaterally.    Cardiovascular   Cardiovascular examination reveals  - normal heart sounds, regular rate and rhythm.    Neurologic   Mental Status: Speech - Normal. Cognitive function - appropriate fund of knowledge. No impairment of attention, Impairment of concentration, " impairment of long term memory or impairment of short term memory.  Cranial Nerves:   II Optic: Visual acuity - Left - Normal. Right - Normal. Visual fields - Normal (to confrontation).  III Oculomotor: Pupillary constriction - Left - Normal. Right - Normal.  VII Facial: - Normal Bilaterally.   IX Glossopharyngeal / X Vagus - Normal.  XI Accessory: Trapezius - Bilateral - Normal. Sternocleidomastoid - Bilateral - Normal.  XII Hypoglossal - Bilateral - Normal.  Eye Movements: - Normal Bilaterally.  Sensory:   Light Touch: Intact - Globally.  Motor:   Bulk and Contour: - Normal.  Tone: - Normal.  Tremor: Not present.  Strength: 5/5 normal muscle strength - All Muscles.   General Assessment of Reflexes: - deep tendon reflexes are normal. Coordination - No Impairment of finger-to-nose or Impairment of rapid alternating movements. Gait - Normal.       Result Review :                 Assessment and Plan    Problem List Items Addressed This Visit       Fibromyalgia    Current Assessment & Plan   I will place referral to rheumatology.          Relevant Orders    Ambulatory Referral to Rheumatology (Completed)    Bilateral occipital neuralgia    Current Assessment & Plan   I also performed bilateral GREATER ONBs in 4/2025 which has helped with occipital neuralgia symptoms.  We can consider repeating the ONBs in the future as well if needed, she is doing well at this time.           Relevant Medications    Atogepant (Qulipta) 30 MG tablet    ubrogepant (Ubrelvy) 100 MG tablet    amitriptyline (ELAVIL) 25 MG tablet    propranolol (INDERAL) 20 MG tablet    Migraine with aura and without status migrainosus, not intractable - Primary    Current Assessment & Plan   45 year old right handed woman with migraines and occipital neuralgia and injections site reaction to the Ajovy and doing better with the Qulipta, amitriptyline and propranolol combination for prevention and Ubrelvy for acute treatment and has had 2 migraines for which  she used Ubrelvy.  She reports a history of migraines starting in her late 20's.  It seemed to improve with changing her birth control over the past several years since moving to [place], [place].  She tells me she has been diagnosed with fibromyalgia around 6+ years ago after having her daughter.  She has headaches involving the back of her head and also her temples and front of her head which have increased since 3/2024 and has been more consistent.  The pain is typically dull and achy which she rates as 8/10 on pain scale 1-10 when most severe with associated light and sound sensitivity along with nausea without vomiting.  They can last up to 2-3 days in duration.  She is waking up with some discomfort every day.  She is currently getting 2 headache days per month and previously 2-3 headache days per week.  She had a brain MRI scan on 6/17/2024 which does not demonstrate any acute intracranial abnormalities with minimal white matter changes.  She tells me today that her mother had migraines.  She does report having menstrual related migraines.  She has noticed that barometric pressure changes can also effect her as well.  She is currently on amitriptyline 25 mg at bedtime along with magnesium and Co-Q10 and melatonin and she thinks this has helped with her sleep.  She has tried sumatriptan which was not helpful.  She has tried Flexeril in the past which helped with sleep.  She was also given Nurtec ODT and Ubrelvy samples which dulled her headaches and allows her to function and uses the Ubrelvy as needed.  She thinks that Aleve helps and she has tried Esgic which is ok but not great.  She does think Tylenol also helps at times.  She has had some heart palpitations with the naratriptan so she stopped using this medicine.  She does report having significant tension in her muscles in her neck and shoulders.  She does report having some anxiety.  She is currently on birth control that is non-estrogen based.  Her   has had a vasectomy.  I started her on Ajovy which did help with the migraines but she had significant injection site irritation associated with the injection which is a good sized red discoloration which is raised so we discontinued this medicine and started her on Qulipta for migraine prevention which has been helpful and Ubrelvy also helps acutely.  She is doing well at this time and I will continue current medications as prescribed.  Discussed migraine triggers and lifestyle modifications.           Relevant Medications    Atogepant (Qulipta) 30 MG tablet    ubrogepant (Ubrelvy) 100 MG tablet    amitriptyline (ELAVIL) 25 MG tablet    propranolol (INDERAL) 20 MG tablet       Follow Up   Return in about 3 months (around 10/8/2025).  Patient was given instructions and counseling regarding her condition or for health maintenance advice. Please see specific information pulled into the AVS if appropriate.

## 2025-07-29 NOTE — TELEPHONE ENCOUNTER
Lvm asking which one worked better, so we can send in an RX explained that these should be used PRN- if used continuously can cause rebound HA.   Please see the attached refill request.

## 2025-08-19 ENCOUNTER — OFFICE VISIT (OUTPATIENT)
Dept: OBSTETRICS AND GYNECOLOGY | Facility: CLINIC | Age: 45
End: 2025-08-19
Payer: COMMERCIAL

## 2025-08-19 VITALS
WEIGHT: 206.2 LBS | DIASTOLIC BLOOD PRESSURE: 86 MMHG | BODY MASS INDEX: 34.35 KG/M2 | SYSTOLIC BLOOD PRESSURE: 132 MMHG | HEIGHT: 65 IN

## 2025-08-19 DIAGNOSIS — Z11.51 SCREENING FOR HUMAN PAPILLOMAVIRUS: ICD-10-CM

## 2025-08-19 DIAGNOSIS — Z12.31 BREAST CANCER SCREENING BY MAMMOGRAM: ICD-10-CM

## 2025-08-19 DIAGNOSIS — Z01.419 WOMEN'S ANNUAL ROUTINE GYNECOLOGICAL EXAMINATION: Primary | ICD-10-CM

## 2025-08-19 DIAGNOSIS — Z30.9 ENCOUNTER FOR CONTRACEPTIVE MANAGEMENT, UNSPECIFIED TYPE: ICD-10-CM

## 2025-08-19 DIAGNOSIS — Z01.419 ROUTINE GYNECOLOGICAL EXAMINATION: ICD-10-CM

## 2025-08-19 DIAGNOSIS — Z01.419 CERVICAL SMEAR, AS PART OF ROUTINE GYNECOLOGICAL EXAMINATION: ICD-10-CM

## 2025-08-19 RX ORDER — ACETAMINOPHEN AND CODEINE PHOSPHATE 120; 12 MG/5ML; MG/5ML
1 SOLUTION ORAL DAILY
Qty: 84 TABLET | Refills: 3 | Status: SHIPPED | OUTPATIENT
Start: 2025-08-19

## 2025-08-20 ENCOUNTER — PATIENT ROUNDING (BHMG ONLY) (OUTPATIENT)
Dept: OBSTETRICS AND GYNECOLOGY | Facility: CLINIC | Age: 45
End: 2025-08-20
Payer: COMMERCIAL

## 2025-08-21 LAB
CYTOLOGIST CVX/VAG CYTO: ABNORMAL
CYTOLOGY CVX/VAG DOC CYTO: ABNORMAL
CYTOLOGY CVX/VAG DOC THIN PREP: ABNORMAL
DX ICD CODE: ABNORMAL
DX ICD CODE: ABNORMAL
HPV I/H RISK 4 DNA CVX QL PROBE+SIG AMP: POSITIVE
HPV16 DNA CVX QL PROBE+SIG AMP: NEGATIVE
HPV18+45 E6+E7 MRNA CVX QL NAA+PROBE: NEGATIVE
OTHER STN SPEC: ABNORMAL
PATHOLOGIST CVX/VAG CYTO: ABNORMAL
RECOM F/U CVX/VAG CYTO: ABNORMAL
SERVICE CMNT-IMP: ABNORMAL
STAT OF ADQ CVX/VAG CYTO-IMP: ABNORMAL

## 2025-08-26 ENCOUNTER — OFFICE VISIT (OUTPATIENT)
Dept: OBSTETRICS AND GYNECOLOGY | Facility: CLINIC | Age: 45
End: 2025-08-26
Payer: COMMERCIAL

## 2025-08-26 VITALS
HEIGHT: 65 IN | WEIGHT: 209.2 LBS | DIASTOLIC BLOOD PRESSURE: 92 MMHG | SYSTOLIC BLOOD PRESSURE: 132 MMHG | BODY MASS INDEX: 34.85 KG/M2

## 2025-08-26 DIAGNOSIS — R87.810 ASCUS WITH POSITIVE HIGH RISK HPV CERVICAL: Primary | ICD-10-CM

## 2025-08-26 DIAGNOSIS — R87.610 ASCUS WITH POSITIVE HIGH RISK HPV CERVICAL: Primary | ICD-10-CM

## 2025-08-26 LAB
B-HCG UR QL: NEGATIVE
BILIRUB BLD-MCNC: NEGATIVE MG/DL
CLARITY, POC: CLEAR
COLOR UR: YELLOW
EXPIRATION DATE: NORMAL
GLUCOSE UR STRIP-MCNC: NEGATIVE MG/DL
INTERNAL NEGATIVE CONTROL: NORMAL
INTERNAL POSITIVE CONTROL: NORMAL
KETONES UR QL: NEGATIVE
LEUKOCYTE EST, POC: NEGATIVE
Lab: NORMAL
NITRITE UR-MCNC: NEGATIVE MG/ML
PH UR: 5 [PH] (ref 5–8)
PROT UR STRIP-MCNC: NEGATIVE MG/DL
RBC # UR STRIP: NEGATIVE /UL
SP GR UR: 1 (ref 1–1.03)
UROBILINOGEN UR QL: NORMAL

## 2025-08-27 DIAGNOSIS — K21.00 GASTROESOPHAGEAL REFLUX DISEASE WITH ESOPHAGITIS WITHOUT HEMORRHAGE: ICD-10-CM

## 2025-08-28 LAB
DX ICD CODE: NORMAL
DX ICD CODE: NORMAL
PATH REPORT.FINAL DX SPEC: NORMAL
PATH REPORT.GROSS SPEC: NORMAL
PATH REPORT.RELEVANT HX SPEC: NORMAL
PATH REPORT.SITE OF ORIGIN SPEC: NORMAL
PATHOLOGIST NAME: NORMAL
PAYMENT PROCEDURE: NORMAL

## 2025-08-28 RX ORDER — OMEPRAZOLE 40 MG/1
40 CAPSULE, DELAYED RELEASE ORAL DAILY
Qty: 90 CAPSULE | Refills: 2 | Status: SHIPPED | OUTPATIENT
Start: 2025-08-28

## (undated) DEVICE — BW-412T DISP COMBO CLEANING BRUSH: Brand: SINGLE USE COMBINATION CLEANING BRUSH

## (undated) DEVICE — LINER SURG CANSTR SXN S/RIGD 1500CC

## (undated) DEVICE — ADAPT CLN BIOGUARD AIR/H2O DISP

## (undated) DEVICE — THE BITE BLOCK MAXI, LATEX FREE STRAP IS USED TO PROTECT THE ENDOSCOPE INSERTION TUBE FROM BEING BITTEN BY THE PATIENT.

## (undated) DEVICE — GLV SURG SENSICARE PI MIC PF SZ8 LF STRL

## (undated) DEVICE — FRCP BX RADJAW4 NDL 2.8 240CM LG OG BX40

## (undated) DEVICE — VIAL FORMALIN CAP 10P 40ML

## (undated) DEVICE — KT ORCA ORCAPOD DISP STRL

## (undated) DEVICE — JACKT LAB F/R KNIT CUFF/COLR XLG BLU

## (undated) DEVICE — Device